# Patient Record
Sex: MALE | Race: WHITE | NOT HISPANIC OR LATINO | Employment: FULL TIME | URBAN - METROPOLITAN AREA
[De-identification: names, ages, dates, MRNs, and addresses within clinical notes are randomized per-mention and may not be internally consistent; named-entity substitution may affect disease eponyms.]

---

## 2018-09-07 ENCOUNTER — APPOINTMENT (OUTPATIENT)
Dept: URGENT CARE | Facility: CLINIC | Age: 48
End: 2018-09-07
Payer: OTHER MISCELLANEOUS

## 2018-09-07 ENCOUNTER — APPOINTMENT (OUTPATIENT)
Dept: RADIOLOGY | Facility: CLINIC | Age: 48
End: 2018-09-07
Payer: OTHER MISCELLANEOUS

## 2018-09-07 DIAGNOSIS — M79.672 LEFT FOOT PAIN: Primary | ICD-10-CM

## 2018-09-07 DIAGNOSIS — M79.672 LEFT FOOT PAIN: ICD-10-CM

## 2018-09-07 PROCEDURE — G0382 LEV 3 HOSP TYPE B ED VISIT: HCPCS

## 2018-09-07 PROCEDURE — 73630 X-RAY EXAM OF FOOT: CPT

## 2018-09-07 PROCEDURE — 99283 EMERGENCY DEPT VISIT LOW MDM: CPT

## 2018-09-12 ENCOUNTER — APPOINTMENT (OUTPATIENT)
Dept: URGENT CARE | Facility: CLINIC | Age: 48
End: 2018-09-12
Payer: OTHER MISCELLANEOUS

## 2018-09-12 PROCEDURE — 99213 OFFICE O/P EST LOW 20 MIN: CPT

## 2024-01-17 NOTE — TELEPHONE ENCOUNTER
Caller: Patient    Doctor: Jack    Reason for call: Patient's physician Dr. Amari Colin will be sending over records to you to review for this patient. He would like him to come to you for a second opinion. Patient had surgery he stated on his shoulder RTC Repair about 8 months ago with someone in [facility] and is having problems.    This is a Work Comp case. His  or physician will be sending records, please keep an eye out, once reviewed please have office staff call patient to schedule.    I advised patient I am unable to schedule until records are reviewed as per protocol for second opinions.       Call back#: [phone]

## 2024-01-18 NOTE — TELEPHONE ENCOUNTER
No records scanned in as of today.  Please keep eye out and set aside. Please give to Jack once we receive to make sure we have everything he needs to provide an opinion

## 2024-01-24 NOTE — TELEPHONE ENCOUNTER
Caller: Patient    Doctor: Jack    Reason for call: Patient called to verify Dr. Santiago's fax number and stated he will [facility] to have medical records faxed over to be reviewed for 2nd opinion of rotator cuff sx from May 2023.    Please advise patient once records are reviewed.    Call back#: [phone]

## 2024-02-15 ENCOUNTER — ANESTHESIA (OUTPATIENT)
Dept: ANESTHESIOLOGY | Facility: HOSPITAL | Age: 54
End: 2024-02-15

## 2024-02-15 ENCOUNTER — ANESTHESIA EVENT (OUTPATIENT)
Dept: ANESTHESIOLOGY | Facility: HOSPITAL | Age: 54
End: 2024-02-15

## 2024-02-15 VITALS
DIASTOLIC BLOOD PRESSURE: 95 MMHG | WEIGHT: 206 LBS | BODY MASS INDEX: 28.84 KG/M2 | SYSTOLIC BLOOD PRESSURE: 147 MMHG | HEART RATE: 103 BPM | HEIGHT: 71 IN

## 2024-02-15 DIAGNOSIS — M75.101 TEAR OF RIGHT SUPRASPINATUS TENDON: Primary | ICD-10-CM

## 2024-02-15 PROCEDURE — 99204 OFFICE O/P NEW MOD 45 MIN: CPT | Performed by: ORTHOPAEDIC SURGERY

## 2024-02-15 RX ORDER — CHLORHEXIDINE GLUCONATE ORAL RINSE 1.2 MG/ML
15 SOLUTION DENTAL ONCE
OUTPATIENT
Start: 2024-02-15 | End: 2024-02-15

## 2024-02-15 NOTE — PATIENT INSTRUCTIONS
You are being scheduled for a shoulder arthroscopy to treat your symptoms.  Below are some instructions and information on what to expect before and after your surgery.        Pre-Surgical Preparation for Arthroscopic Shoulder Surgery:     You will be contacted the evening prior to your surgery to confirm the scheduled time of the procedure and when to arrive at the hospital.   Do not eat or drink anything after midnight the night before your surgery.  Since you are having out-patient surgery, make sure that you have someone who can drive you home later in the day.  Also, prepare that person for a long day, as the process of safely preparing for and recovering from the procedure is more time consuming than the actual procedure!      As you will be in a sling after surgery, please wear or bring a loose fitting button-down shirt so that you can easily place this over the sling when you leave the surgical suite.  This avoids having to place the operative arm in a sleeve.  Most patients find that this is the easiest outfit to wear for the first week or so after surgery so you may want to plan accordingly.    Most patients find that lying down in bed after shoulder surgery accentuates their discomfort.  This is likely related to the effect of gravity on the swelling in the shoulder.  As a result, most patients sleep better in a recliner or in bed with pillows propped up behind their back for the first few days or weeks after surgery.  It is a good idea to plan for this ahead of time so there will be less hassle getting things set up the night after surgery.       What to Expect After Arthroscopic Shoulder Surgery:    It is normal to have swelling and discomfort in the shoulder for several days or a week after surgery.  It is also normal to have a small amount of drainage from the surgical wounds (especially the first few days after surgery), as we put fluid into the shoulder to visualize the structures during surgery.   "It is NOT normal to have foul smelling, purulent drainage and if this is noted, please contact the office immediately or proceed to the emergency room for evaluation as this may indicate an infection.     Applying ice bags to the shoulder may help with pain that is not controlled by the regional block.  Ice should be applied 20-30 minutes at a time, every hour or two.  Make sure to put a thin towel or T-shirt next to your skin to avoid direct contact of the ice with the skin. Icing is most helpful in the first 48 hours, although many people find that continuing past this time frame lessens their postoperative pain.  Please note that your post-operative dressing is not conductive to ice, so if you need to, it is okay to remove that dressing even the night after surgery and place band-aids over the wounds in order for the ice to take effect.     Pain Control    Most patients will receive a nerve block, the local anesthetic may keep your whole arm numb for up to 4 days.  You will be given a prescription for narcotic pain medication when you are discharged from the hospital.  With the newer nerve block that is being utilized, patients are rarely requiring the use of this narcotic pain medication.  If you find you do not tolerate that type of pain medicine well, call our office and we will try another one.     In addition to the narcotic pain medication, it is safe to use an anti-inflammatory (unless the patient has a medical condition that would not allow safe use of this mediation).  This includes the Advil, Motrin, Ibuprofen and Alleve category of medications.  Simply follow the over the counter dosing on the package and use as indicated as another adjunct.  Importantly since these medications are all very similar, use only one of them.  Tylenol is a separate medication that can be utilized as well and can be taken at the same time as the other medication or given in a \"staggered\" manner.  Just make sure that you " follow the dosing on the over the counter bottle instructions.  Also make sure that the pain medication prescribed by Dr Santiago's team does not contain acetaminophen (this is found in Percocet and Vicodin).   Typically we do not prescribe those types of pain medications but if for some reason that has been prescribed DO NOT add more Tylenol (acetaminophen) as you could end up taking too much of that medication.    As mentioned above, most patients find that lying down accentuates their discomfort. You might sleep better in a recliner, or propped up in bed.     Dr. Santiago encourages patients to safely ambulate around the house as much as possible in the first few days after the procedure as this can help with blood circulation in the legs. While the incidence of blood clots is very rare following shoulder surgery, early ambulation is a great way to help decrease the already low rate.    24 hours after the surgery you may remove the bandage and cover incisions with Band-Aids if needed. At that time you may shower, the wounds will have a surgical glue that will protect them from shower water but do not submerge your incisions directly (bathing or swimming) until at least 2 weeks post-operatively.  It is safe to let the arm hang at the side and take a shower and put on a shirt without the sling on.  Just make sure that you do not use the operative are to reach out and grab anything as that may damage the repair.  When you are done showering and getting dressed please return the operative arm to the sling.    Unless noted otherwise in your discharge paperwork, Dr. Santiago uses absorbable sutures so they do not need to be removed.    Dr. Santiago’s physician assistant (PA) will see you in the office a few days after the procedure to review the intra-operative findings and to initiate physical therapy if appropriate.  A post-operative appointment should have been scheduled for you already, but if for some reason this did  not happen, please call the office to make one.     Physical therapy is important after nearly all shoulder surgeries and a detailed rehabilitation plan based on the specific intra-operative findings and procedures will be provided to your therapist at the first post-operative office visit.  Most patients have post-operative therapy appointments scheduled pre-operatively, but if you do not, that will be handled at the first post-operative office visit.  Unless expressly directed otherwise it is safe to remove the sling even the first day after the surgery and let the arm hang by the side.  This allows patients to shower and even put a shirt on (bad arm in the sleeve first).  It is also safe to flex and extend their wrist, hand and fingers as much as possible when the block wears off.  These simple motions can serve to pump fluid out of the forearm and decrease swelling in the arm.

## 2024-02-15 NOTE — LETTER
February 15, 2024     Amari Colin MD  [address]  [address]  [facility] [address]    Patient: Amari Jenkins   YOB: 1970   Date of Visit: 2/15/2024       Dear Dr. Colin:    Thank you for referring Amari Jenkins to me for evaluation. Below are my notes for this consultation.    If you have questions, please do not hesitate to call me. I look forward to following your patient along with you.         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  Clinically the long head biceps tendon rupture is not significant and I do not recommend further treatment for that although will result in a cosmetic deformity which the patient is willing to accept.  The recurrent supraspinatus tear is a different matter and I do believe in my opinion for the best chance of the patient to return to his preinjury level of function he should consider revision rotator cuff repair.  We did discuss the complexities of revision repair and the decreased success that procedure has compared to primary repair but given the patient's young age, his high activity level, and his desire to return to his preinjury level of function he does wish to undergo revision repair.  He does understand that if revision repair is not able to improve his function and symptoms then further more aggressive treatment in the form of arthroplasty may have to be considered, I am hopeful to avoid this in a young active individual hence my opinion to attempt a revision repair.    A thorough discussion was performed with the patient regarding the risks and  benefit of operative and nonoperative treatment of their rotator cuff tear.  Risks discussed include but were not limited to infection, neurovascular injury, recurrent tear, nonhealing of the repair, need for further surgery, need for biceps tenodesis or tenotomy, stiffness, need for prolonged rehabilitation, as well as the risk of anesthesia.  After this discussion all questions were answered and informed consent was obtained in the office for arthroscopic revision rotator cuff repair of the right shoulder.  The patient will be scheduled for this procedure accordingly.     Discussed smoking cessation as it can have a direct impact on healing.  The patient has committed to doing what ever he can to decrease the amount of smoking he currently does to help him achieve success with revision repair.     The patient is being cared for by Dr. Colin of physical medicine and rehabilitation and as such his restrictions are being managed by him and we will continue to allow him to manage those restrictions for his return to work.  I will provide guidance postoperatively as to what the shoulder can except as far as use following revision rotator cuff repair.      Subjective:   Patient ID: Amari Jenkins is a 54 y.o. male      HPI  The patient presents with a chief complaint of right shoulder pain.   The pain began 3/14/23 and is associated with an acute injury.  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 He was seen by Dr. rBennen Casas who ordered a MRI.  Patient had a right shoulder arthroscopic rotator cuff repair, subacromial decompression and partial acromioplasty, performed on 5/4/2023 by Dr. Brennen Casas at [facility].  He reports minimal improvement following surgery.  He reports persistent pain and weakness.    The patient describes the pain as aching, dull, and sharp in intensity,  intermittent in timing, and localizes the pain to the  right subacromial  "joint.  The pain is worse with movement, overhead work, overuse, and raising arm over head and relieved by rest.  Patient reports numbness and tingling in the thumb, index and long fingers.  The pain is not associated with constitutional symptoms. The patient is awoken at night by the pain.        The following portions of the patient's history were reviewed and updated as appropriate: allergies, current medications, past family history, past medical history, past social history, past surgical history and problem list.        Objective:  /95 (BP Location: Right arm, Patient Position: Sitting, Cuff Size: Large)   Pulse 103   Ht 5' 11\" (1.803 m)   Wt 93.4 kg (206 lb)   BMI 28.73 kg/m²       Right Shoulder Exam     Tenderness   The patient is experiencing no tenderness.    Range of Motion   External rotation:  70   Forward flexion:  170   Internal rotation 0 degrees:  Mid thoracic     Muscle Strength   Abduction: 4/5   External rotation: 4/5     Tests   Terry test: positive  Impingement: positive    Other   Erythema: absent  Sensation: normal  Pulse: present    Comments:    Positive pop-eye deformity             Physical Exam  Vitals and nursing note reviewed.   Constitutional:       Appearance: He is well-developed.   HENT:      Head: Normocephalic and atraumatic.   Eyes:      Pupils: Pupils are equal, round, and reactive to light.   Cardiovascular:      Rate and Rhythm: Normal rate and regular rhythm.      Pulses: Normal pulses.      Heart sounds: Normal heart sounds.   Pulmonary:      Effort: Pulmonary effort is normal. No respiratory distress.      Breath sounds: Normal breath sounds.   Abdominal:      General: Abdomen is flat. There is no distension.      Palpations: Abdomen is soft.   Musculoskeletal:      Cervical back: Normal range of motion and neck supple.   Skin:     General: Skin is warm and dry.   Neurological:      Mental Status: He is alert and oriented to person, place, and time. "   Psychiatric:         Mood and Affect: Mood normal.         Behavior: Behavior normal.       External Records Reviewed: Operative reports, office notes from Dr. Casas and office notes from Dr. Colin.     I have personally reviewed pertinent films in PACS and my interpretation is as follows.  MRI arthrogram right shoulder demonstrates post surgical changes with appropriate position of anchors, recurrent partial thickness supraspinatus tear possibly extending to the anterior infraspinatus.  The midportion of the supraspinatus does appear to have a full-thickness recurrent tear through which contrast is leaking    Preoperative MRI of the right shoulder from April 3, 2023 was also reviewed to compare to the postoperative image.  The preoperative MRI demonstrates a full-thickness supraspinatus tendon tear with retraction to the humeral head and no significant muscle atrophy    Scribe Attestation      I,:  Alyce Garg am acting as a scribe while in the presence of the attending physician.:       I,:  Navjot Santiago MD personally performed the services described in this documentation    as scribed in my presence.:

## 2024-02-15 NOTE — PROGRESS NOTES
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  Clinically the long head biceps tendon rupture is not significant and I do not recommend further treatment for that although will result in a cosmetic deformity which the patient is willing to accept.  The recurrent supraspinatus tear is a different matter and I do believe in my opinion for the best chance of the patient to return to his preinjury level of function he should consider revision rotator cuff repair.  We did discuss the complexities of revision repair and the decreased success that procedure has compared to primary repair but given the patient's young age, his high activity level, and his desire to return to his preinjury level of function he does wish to undergo revision repair.  He does understand that if revision repair is not able to improve his function and symptoms then further more aggressive treatment in the form of arthroplasty may have to be considered, I am hopeful to avoid this in a young active individual hence my opinion to attempt a revision repair.    A thorough discussion was performed with the patient regarding the risks and benefit of operative and nonoperative treatment of their rotator cuff tear.  Risks discussed include but were not limited to infection, neurovascular injury, recurrent tear, nonhealing of the repair, need for further surgery, need for biceps tenodesis or tenotomy, stiffness, need for prolonged rehabilitation, as well as the risk of anesthesia.  After this discussion all questions were answered and informed consent was obtained in the office for arthroscopic revision rotator cuff repair of the right shoulder.  The patient will be scheduled for this procedure  accordingly.     Discussed smoking cessation as it can have a direct impact on healing.  The patient has committed to doing what ever he can to decrease the amount of smoking he currently does to help him achieve success with revision repair.     The patient is being cared for by Dr. Colin of physical medicine and rehabilitation and as such his restrictions are being managed by him and we will continue to allow him to manage those restrictions for his return to work.  I will provide guidance postoperatively as to what the shoulder can except as far as use following revision rotator cuff repair.      Subjective:   Patient ID: Amari Jenkins is a 54 y.o. male      HPI  The patient presents with a chief complaint of right shoulder pain.   The pain began 3/14/23 and is associated with an acute injury.  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 He was seen by Dr. Brennen Casas who ordered a MRI.  Patient had a right shoulder arthroscopic rotator cuff repair, subacromial decompression and partial acromioplasty, performed on 5/4/2023 by Dr. Brennen Casas at [facility].  He reports minimal improvement following surgery.  He reports persistent pain and weakness.    The patient describes the pain as aching, dull, and sharp in intensity,  intermittent in timing, and localizes the pain to the  right subacromial joint.  The pain is worse with movement, overhead work, overuse, and raising arm over head and relieved by rest.  Patient reports numbness and tingling in the thumb, index and long fingers.  The pain is not associated with constitutional symptoms. The patient is awoken at night by the pain.        The following portions of the patient's history were reviewed and updated as appropriate: allergies, current medications, past family history, past medical history, past social history, past surgical history and problem list.        Objective:  /95 (BP  "Location: Right arm, Patient Position: Sitting, Cuff Size: Large)   Pulse 103   Ht 5' 11\" (1.803 m)   Wt 93.4 kg (206 lb)   BMI 28.73 kg/m²       Right Shoulder Exam     Tenderness   The patient is experiencing no tenderness.    Range of Motion   External rotation:  70   Forward flexion:  170   Internal rotation 0 degrees:  Mid thoracic     Muscle Strength   Abduction: 4/5   External rotation: 4/5     Tests   Terry test: positive  Impingement: positive    Other   Erythema: absent  Sensation: normal  Pulse: present    Comments:    Positive pop-eye deformity             Physical Exam  Vitals and nursing note reviewed.   Constitutional:       Appearance: He is well-developed.   HENT:      Head: Normocephalic and atraumatic.   Eyes:      Pupils: Pupils are equal, round, and reactive to light.   Cardiovascular:      Rate and Rhythm: Normal rate and regular rhythm.      Pulses: Normal pulses.      Heart sounds: Normal heart sounds.   Pulmonary:      Effort: Pulmonary effort is normal. No respiratory distress.      Breath sounds: Normal breath sounds.   Abdominal:      General: Abdomen is flat. There is no distension.      Palpations: Abdomen is soft.   Musculoskeletal:      Cervical back: Normal range of motion and neck supple.   Skin:     General: Skin is warm and dry.   Neurological:      Mental Status: He is alert and oriented to person, place, and time.   Psychiatric:         Mood and Affect: Mood normal.         Behavior: Behavior normal.       External Records Reviewed: Operative reports, office notes from Dr. Casas and office notes from Dr. Colin.     I have personally reviewed pertinent films in PACS and my interpretation is as follows.  MRI arthrogram right shoulder demonstrates post surgical changes with appropriate position of anchors, recurrent partial thickness supraspinatus tear possibly extending to the anterior infraspinatus.  The midportion of the supraspinatus does appear to have a " full-thickness recurrent tear through which contrast is leaking    Preoperative MRI of the right shoulder from April 3, 2023 was also reviewed to compare to the postoperative image.  The preoperative MRI demonstrates a full-thickness supraspinatus tendon tear with retraction to the humeral head and no significant muscle atrophy    Scribe Attestation      I,:  Alyce Garg am acting as a scribe while in the presence of the attending physician.:       I,:  Navjot Santiago MD personally performed the services described in this documentation    as scribed in my presence.:

## 2024-02-22 ENCOUNTER — PATIENT OUTREACH (OUTPATIENT)
Dept: OTHER | Facility: CLINIC | Age: 54
End: 2024-02-22

## 2024-02-22 ENCOUNTER — TELEPHONE (OUTPATIENT)
Age: 54
End: 2024-02-22

## 2024-02-22 ENCOUNTER — TELEPHONE (OUTPATIENT)
Dept: OBGYN CLINIC | Facility: OTHER | Age: 54
End: 2024-02-22

## 2024-02-22 NOTE — TELEPHONE ENCOUNTER
I called patient yesterday and today asking for an update for w/c, he states information is in chart but the number listed in his claim in not a valid number. The PEC team tried to call the phone number and the calls will not go through. I told him because he does not have surgery and we cannot get a hold of an  they may eventually have to be self pay and I could provide him to price checkers number. Patient state he will call his previous doctor to get information as he does not have anything. He did say he has a  as well but did not provide further information about w/c. Patient will call myself or the office with further information.    REVIEW OF SYSTEMS:    GENERAL:  Patient denies fever, chills, tiredness, malaise.  EYES:  Patient denies blurred vision, double vision, pain, burning and itching.   NEUROLOGIC:  Patient denies tremors, dizzy spells, numbness, tingling, vision change, loss of balance.  ENDOCRINE:  Patient denies excessive thirst, heat intolerance, lymphnode enlargement.  GASTROINTESTINAL:  Patient denies abdominal pain, nausea/vomiting, indigestion/heartburn, diarrhea, constipation.  CARDIOVASCUALR:  Patient denies chest pain, varicose veins, high blood pressure.  SKIN:  Patient denies skin rashes, boils, persistent itching, acne.  MUSCULOSKELETAL:  Patient denies joint pain, neck pain, back pain, leg swelling.  ENT/MOUTH:  Patient denies ear infections, sore throats, sinus problems, hearing loss.  RESPIRATORY:  Patient denies wheezing, frequent cough, but complains of shortness of breath.   :  Patient denies urine retention, painful urination, urinary frequency, blood in urine, nocturia.  HEME/LYMPHATICE:  Patient denies swollen glands, blood clotting problems.   PSYCHOLOGICAL:  Patient denies anxiety, depression.

## 2024-02-22 NOTE — TELEPHONE ENCOUNTER
Caller: Patient    Doctor: Jack    Reason for call: Patient calling because he noticed on his MyChart that the Shoulder Arthrogram in imaging from 12/29/23 is labeled left shoulder when it should be right.    Patient would like to let the office know and see if this could be rectified, as he has concerns about Workers Comp.    Call back#: [phone]

## 2024-02-27 ENCOUNTER — TELEPHONE (OUTPATIENT)
Age: 54
End: 2024-02-27

## 2024-02-27 NOTE — TELEPHONE ENCOUNTER
Spoke with patient to let him know we do not reach out to , unfortunately the number for the  Steff is the number the [employer] has been calling with no response. He says not to cancel his surgery but I did mention that the auth team said if they do not hear back by tomorrow we will have to reschedule his surgery or carine it as self pay and send case to price checkers. He is aware and understood.

## 2024-02-27 NOTE — TELEPHONE ENCOUNTER
Caller: Domenica    Doctor: Jack    Reason for call: Please call  Insurance they need to speak to someone regarding the SX for this Friday.  Works comp # Steff Singh [id number]  Also call law office  Call back#: [phone]

## 2024-02-29 ENCOUNTER — TELEPHONE (OUTPATIENT)
Dept: OBGYN CLINIC | Facility: OTHER | Age: 54
End: 2024-02-29

## 2024-03-21 ENCOUNTER — TELEPHONE (OUTPATIENT)
Age: 54
End: 2024-03-21

## 2024-03-21 NOTE — TELEPHONE ENCOUNTER
Caller: W/C    Doctor: Jack    Reason for call: Asked if patient has seen jack since 2/15/24, information was given

## 2024-08-12 ENCOUNTER — PREP FOR PROCEDURE (OUTPATIENT)
Dept: OBGYN CLINIC | Facility: OTHER | Age: 54
End: 2024-08-12

## 2024-08-12 DIAGNOSIS — M75.101 TEAR OF RIGHT SUPRASPINATUS TENDON: Primary | ICD-10-CM

## 2024-08-12 RX ORDER — SODIUM CHLORIDE, SODIUM LACTATE, POTASSIUM CHLORIDE, CALCIUM CHLORIDE 600; 310; 30; 20 MG/100ML; MG/100ML; MG/100ML; MG/100ML
125 INJECTION, SOLUTION INTRAVENOUS CONTINUOUS
OUTPATIENT
Start: 2024-08-12

## 2024-08-14 ENCOUNTER — ANESTHESIA EVENT (OUTPATIENT)
Dept: PERIOP | Facility: AMBULARY SURGERY CENTER | Age: 54
End: 2024-08-14
Payer: OTHER MISCELLANEOUS

## 2024-08-19 ENCOUNTER — TELEPHONE (OUTPATIENT)
Dept: OBGYN CLINIC | Facility: OTHER | Age: 54
End: 2024-08-19

## 2024-08-19 NOTE — TELEPHONE ENCOUNTER
Caller: Patient    Doctor: Jack    Reason for call: Patient is returning a call from the office. Stated the surgery coordinator Aracelis should have the number. I mentioned to the patient that Aracelis is the employee that left him a message. Amari stated he will have to contact his  office for the workers comp number because he does not have it.     Call back#: [phone]

## 2024-08-19 NOTE — TELEPHONE ENCOUNTER
Spoke with patient and he provided a new number for his  [phone] Samuel Kendrick. I told him I will pass this information to the [employer] team

## 2024-08-23 NOTE — PRE-PROCEDURE INSTRUCTIONS
Pre-Surgery Instructions:   Medication Instructions    Acetaminophen 500 MG Take day of surgery.    Medication instructions for day surgery reviewed. Please use only a sip of water to take your instructed medications. Avoid all over the counter vitamins, supplements and NSAIDS for one week prior to surgery per anesthesia guidelines. Tylenol is ok to take as needed.     You will receive a call one business day prior to surgery with an arrival time and hospital directions. If your surgery is scheduled on a Monday, the hospital will be calling you on the Friday prior to your surgery. If you have not heard from anyone by 8pm, please call the hospital supervisor through the hospital  at [phone]. ([facility] [phone] or [place] [phone]).    Do not eat or drink anything after midnight the night before your surgery, including candy, mints, lifesavers, or chewing gum. Do not drink alcohol 24hrs before your surgery. Try not to smoke at least 24hrs before your surgery.       Follow the pre surgery showering instructions as listed in the “My Surgical Experience Booklet” or otherwise provided by your surgeon's office. Do not use a blade to shave the surgical area 1 week before surgery. It is okay to use a clean electric clippers up to 24 hours before surgery. Do not apply any lotions, creams, including makeup, cologne, deodorant, or perfumes after showering on the day of your surgery. Do not use dry shampoo, hair spray, hair gel, or any type of hair products.     No contact lenses, eye make-up, or artificial eyelashes. Remove nail polish, including gel polish, and any artificial, gel, or acrylic nails if possible. Remove all jewelry including rings and body piercing jewelry.     Wear causal clothing that is easy to take on and off. Consider your type of surgery.    Keep any valuables, jewelry, piercings at home. Please bring any specially ordered equipment (sling, braces) if indicated.    Arrange for a  responsible person to drive you to and from the hospital on the day of your surgery. Please confirm the visitor policy for the day of your procedure when you receive your phone call with an arrival time.     Call the surgeon's office with any new illnesses, exposures, or additional questions prior to surgery.    Please reference your “My Surgical Experience Booklet” for additional information to prepare for your upcoming surgery.

## 2024-08-27 ENCOUNTER — TELEPHONE (OUTPATIENT)
Age: 54
End: 2024-08-27

## 2024-08-27 NOTE — TELEPHONE ENCOUNTER
Caller: [facility]     Doctor: Jack    Reason for call: she called for a new PT script and if the pt should have a pre-op PT appointment. Patient is scheduled for his post op therapy appointment    Call back#: [phone]

## 2024-08-28 ENCOUNTER — ANESTHESIA (OUTPATIENT)
Dept: PERIOP | Facility: AMBULARY SURGERY CENTER | Age: 54
End: 2024-08-28
Payer: OTHER MISCELLANEOUS

## 2024-08-28 DIAGNOSIS — M75.101 TEAR OF RIGHT SUPRASPINATUS TENDON: Primary | ICD-10-CM

## 2024-08-28 NOTE — TELEPHONE ENCOUNTER
Order in computer.    Pre-op appointment not needed.  [facility]

## 2024-08-28 NOTE — TELEPHONE ENCOUNTER
Called and spoke to Asha  Message related  Pre-op Pt appt not needed    Will fax PT order   Fax# [phone]

## 2024-08-28 NOTE — TELEPHONE ENCOUNTER
Faxed PT order along with   RCR Rehab Protocol and surgery date  Confirmation that fax went through was received

## 2024-09-11 ENCOUNTER — HOSPITAL ENCOUNTER (OUTPATIENT)
Facility: AMBULARY SURGERY CENTER | Age: 54
Setting detail: OUTPATIENT SURGERY
Discharge: HOME | End: 2024-09-11
Attending: ORTHOPAEDIC SURGERY | Admitting: ORTHOPAEDIC SURGERY
Payer: OTHER MISCELLANEOUS

## 2024-09-11 VITALS
RESPIRATION RATE: 18 BRPM | OXYGEN SATURATION: 94 % | HEIGHT: 70 IN | BODY MASS INDEX: 28.2 KG/M2 | WEIGHT: 197 LBS | HEART RATE: 82 BPM | SYSTOLIC BLOOD PRESSURE: 121 MMHG | TEMPERATURE: 98 F | DIASTOLIC BLOOD PRESSURE: 79 MMHG

## 2024-09-11 DIAGNOSIS — M75.101 TEAR OF RIGHT SUPRASPINATUS TENDON: Primary | ICD-10-CM

## 2024-09-11 PROCEDURE — 29827 SHO ARTHRS SRG RT8TR CUF RPR: CPT | Performed by: ORTHOPAEDIC SURGERY

## 2024-09-11 PROCEDURE — C1713 ANCHOR/SCREW BN/BN,TIS/BN: HCPCS | Performed by: ORTHOPAEDIC SURGERY

## 2024-09-11 PROCEDURE — NC001 PR NO CHARGE: Performed by: ORTHOPAEDIC SURGERY

## 2024-09-11 PROCEDURE — C9290 INJ, BUPIVACAINE LIPOSOME: HCPCS | Performed by: ANESTHESIOLOGY

## 2024-09-11 PROCEDURE — 29827 SHO ARTHRS SRG RT8TR CUF RPR: CPT | Performed by: PHYSICIAN ASSISTANT

## 2024-09-11 DEVICE — BIO-COMP SWVLK C, CLD 4.75X19.1MM
Type: IMPLANTABLE DEVICE | Site: SHOULDER | Status: FUNCTIONAL
Brand: ARTHREX®

## 2024-09-11 DEVICE — SP FIBERTAK RC, DBLOAD TAPE BL/W, BLK/W
Type: IMPLANTABLE DEVICE | Site: SHOULDER | Status: FUNCTIONAL
Brand: ARTHREX®

## 2024-09-11 RX ORDER — DIPHENHYDRAMINE HYDROCHLORIDE 50 MG/ML
12.5 INJECTION INTRAMUSCULAR; INTRAVENOUS ONCE AS NEEDED
Status: DISCONTINUED | OUTPATIENT
Start: 2024-09-11 | End: 2024-09-11 | Stop reason: HOSPADM

## 2024-09-11 RX ORDER — OXYCODONE HYDROCHLORIDE 5 MG/1
5 TABLET ORAL EVERY 4 HOURS PRN
Status: DISCONTINUED | OUTPATIENT
Start: 2024-09-11 | End: 2024-09-11 | Stop reason: HOSPADM

## 2024-09-11 RX ORDER — ONDANSETRON 2 MG/ML
INJECTION INTRAMUSCULAR; INTRAVENOUS AS NEEDED
Status: DISCONTINUED | OUTPATIENT
Start: 2024-09-11 | End: 2024-09-11

## 2024-09-11 RX ORDER — SODIUM CHLORIDE, SODIUM LACTATE, POTASSIUM CHLORIDE, CALCIUM CHLORIDE 600; 310; 30; 20 MG/100ML; MG/100ML; MG/100ML; MG/100ML
125 INJECTION, SOLUTION INTRAVENOUS CONTINUOUS
Status: DISCONTINUED | OUTPATIENT
Start: 2024-09-11 | End: 2024-09-11 | Stop reason: HOSPADM

## 2024-09-11 RX ORDER — PROPOFOL 10 MG/ML
INJECTION, EMULSION INTRAVENOUS AS NEEDED
Status: DISCONTINUED | OUTPATIENT
Start: 2024-09-11 | End: 2024-09-11

## 2024-09-11 RX ORDER — ONDANSETRON 2 MG/ML
4 INJECTION INTRAMUSCULAR; INTRAVENOUS EVERY 6 HOURS PRN
Status: CANCELLED | OUTPATIENT
Start: 2024-09-11

## 2024-09-11 RX ORDER — ONDANSETRON 2 MG/ML
4 INJECTION INTRAMUSCULAR; INTRAVENOUS ONCE AS NEEDED
Status: DISCONTINUED | OUTPATIENT
Start: 2024-09-11 | End: 2024-09-11 | Stop reason: HOSPADM

## 2024-09-11 RX ORDER — ACETAMINOPHEN 325 MG/1
650 TABLET ORAL EVERY 6 HOURS PRN
Status: CANCELLED | OUTPATIENT
Start: 2024-09-11

## 2024-09-11 RX ORDER — CEFAZOLIN SODIUM 2 G/50ML
2000 SOLUTION INTRAVENOUS ONCE
Status: COMPLETED | OUTPATIENT
Start: 2024-09-11 | End: 2024-09-11

## 2024-09-11 RX ORDER — MIDAZOLAM HYDROCHLORIDE 2 MG/2ML
INJECTION, SOLUTION INTRAMUSCULAR; INTRAVENOUS
Status: COMPLETED | OUTPATIENT
Start: 2024-09-11 | End: 2024-09-11

## 2024-09-11 RX ORDER — FENTANYL CITRATE/PF 50 MCG/ML
25 SYRINGE (ML) INJECTION
Status: DISCONTINUED | OUTPATIENT
Start: 2024-09-11 | End: 2024-09-11 | Stop reason: HOSPADM

## 2024-09-11 RX ORDER — FENTANYL CITRATE 50 UG/ML
INJECTION, SOLUTION INTRAMUSCULAR; INTRAVENOUS
Status: COMPLETED | OUTPATIENT
Start: 2024-09-11 | End: 2024-09-11

## 2024-09-11 RX ORDER — DEXAMETHASONE SODIUM PHOSPHATE 10 MG/ML
INJECTION, SOLUTION INTRAMUSCULAR; INTRAVENOUS AS NEEDED
Status: DISCONTINUED | OUTPATIENT
Start: 2024-09-11 | End: 2024-09-11

## 2024-09-11 RX ORDER — BUPIVACAINE HYDROCHLORIDE 5 MG/ML
INJECTION, SOLUTION EPIDURAL; INTRACAUDAL
Status: COMPLETED | OUTPATIENT
Start: 2024-09-11 | End: 2024-09-11

## 2024-09-11 RX ORDER — SODIUM CHLORIDE, SODIUM LACTATE, POTASSIUM CHLORIDE, CALCIUM CHLORIDE 600; 310; 30; 20 MG/100ML; MG/100ML; MG/100ML; MG/100ML
125 INJECTION, SOLUTION INTRAVENOUS CONTINUOUS
Status: CANCELLED | OUTPATIENT
Start: 2024-09-11

## 2024-09-11 RX ORDER — OXYCODONE HYDROCHLORIDE 5 MG/1
5 TABLET ORAL EVERY 6 HOURS PRN
Qty: 13 TABLET | Refills: 0 | Status: SHIPPED | OUTPATIENT
Start: 2024-09-11

## 2024-09-11 RX ADMIN — FENTANYL CITRATE 100 MCG: 50 INJECTION INTRAMUSCULAR; INTRAVENOUS at 11:20

## 2024-09-11 RX ADMIN — PROPOFOL 200 MG: 10 INJECTION, EMULSION INTRAVENOUS at 12:39

## 2024-09-11 RX ADMIN — BUPIVACAINE HYDROCHLORIDE 10 ML: 5 INJECTION, SOLUTION EPIDURAL; INTRACAUDAL at 11:41

## 2024-09-11 RX ADMIN — BUPIVACAINE 20 ML: 13.3 INJECTION, SUSPENSION, LIPOSOMAL INFILTRATION at 11:20

## 2024-09-11 RX ADMIN — SODIUM CHLORIDE, SODIUM LACTATE, POTASSIUM CHLORIDE, AND CALCIUM CHLORIDE: .6; .31; .03; .02 INJECTION, SOLUTION INTRAVENOUS at 12:33

## 2024-09-11 RX ADMIN — ONDANSETRON 4 MG: 2 INJECTION INTRAMUSCULAR; INTRAVENOUS at 12:50

## 2024-09-11 RX ADMIN — SODIUM CHLORIDE, SODIUM LACTATE, POTASSIUM CHLORIDE, AND CALCIUM CHLORIDE: .6; .31; .03; .02 INJECTION, SOLUTION INTRAVENOUS at 11:15

## 2024-09-11 RX ADMIN — DEXAMETHASONE SODIUM PHOSPHATE 10 MG: 10 INJECTION, SOLUTION INTRAMUSCULAR; INTRAVENOUS at 12:39

## 2024-09-11 RX ADMIN — MIDAZOLAM 2 MG: 1 INJECTION INTRAMUSCULAR; INTRAVENOUS at 11:20

## 2024-09-11 RX ADMIN — CEFAZOLIN SODIUM 2000 MG: 2 SOLUTION INTRAVENOUS at 12:45

## 2024-09-11 NOTE — OP NOTE
OPERATIVE REPORT  PATIENT NAME: Amari Jenkins    :  1970  MRN: [id number]  Pt Location: [facility] OR ROOM 06    SURGERY DATE: 2024     SURGEON: Navjot Santiago MD     ASSISTANT: Belen Tatum PA-C     NOTE: Belen Tatum PA-C was present throughout the entire procedure and performed essential assistance with patient prepping, draping, positioning, suture management, wound closure, sterile dressing application and sling application, all under my direct supervision.     NOTE: No qualified resident physician was available for assistance    PREOPERATIVE DIAGNOSIS:  Right Shoulder Recurrent Supraspinatus Tear    POSTOPERATIVE DIAGNOSIS: Same    PROCEDURES: Surgical Arthroscopy Right Shoulder with Revision Rotator Cuff Repair    ANESTHESIA STAFF:  NINFA West MD      ANESTHESIA TYPE: General LMA with ultrasound guided interscalene block (Exparel). The interscalene block was provided by the anesthesia staff per my request for postoperative pain control and to decrease the use of postoperative narcotic medication for pain control.    COMPLICATIONS: None    FINDINGS: Recurrent, Delaminated Supraspinatus Tear, Mild Glenohumeral Degenerative Changes    SPECIMEN(S):  None    ESTIMATED BLOOD LOSS: Minimal    INDICATION:  Briefly, the patient is a 54 y.o.  male with right shoulder pain following a previous rotator cuff repair. MRI arthrogram scan confirmed a recurrent supraspinatus tear. The patient elected for revision arthroscopic treatment. Informed consent was obtained after a thorough discussion of the risks and benefits of the procedure, as well as alternatives to the procedure.     OPERATIVE TECHNIQUE:  On the day of surgery, I identified the patient’s right shoulder and marked it with my initials. The patient was taken to the operating room where anesthesia was induced and 2 grams of IV Cefazolin were given. The patient was examined in the supine position and was found to have full range of motion of the  right shoulder with no instability. The patient was then positioned in the semilateral [facility] chair position. All bony prominences were padded. The shoulder was prepped and draped in normal sterile fashion. After a time-out for safety, a standard posterolateral arthroscopic portal was made. Glenohumeral evaluation revealed mild/moderate degenerative changes of the humeral head and mild degenerative changes of the glenoid with no areas of full-thickness cartilage loss.  There was evidence of prior arthroscopic tenodesis with no long head biceps tendon present in the glenohumeral joint but I was able to see some sutures likely related to the tenodesis at the entrance of the bicipital groove.  The subscapularis was intact.  The previously repaired supraspinatus did have a small full-thickness recurrent tear with an undersurface delamination of approximately the middle half of the supraspinatus insertion.  The recurrent full-thickness tear was in the midportion of this articular sided delamination.  There were sutures present consistent with previous repair.  The infraspinatus was intact.  After the intra-articular evaluation was completed, the scope was then placed in the subacromial space through the same portal where a thorough bursectomy was performed. The small recurrent full thickness supraspinatus tear was opened up to allow for complete 2 lamina revision repair.  Upon opening the bursal side just anterior and just posterior to the small full-thickness tear multiple sutures were encountered and were removed, others were maintained as we did not wish to destroy the tendon itself.  After opening this up I was able to confirm grasping both the articular and bursal side lamina for repair.  The tear was found to measure 1.2 cm from anterior to posterior after preparation and was able to be easily reduced to the tuberosity.  The tuberosity was prepared in routine fashion and a double row suture bridge  configuration repair with one medial 2.6 mm double loaded Arthrex All-Suture anchor and one lateral 4.75 mm Arthrex BioComposite SwiveLock anchor using four stiches through the tendon in horizontal mattress fashion was performed achieving anatomic reduction of the rotator cuff tendon to the tuberosity.  As mentioned above care was taken to throw each pass through both the inferior articular sided lamina and the superior bursal side lamina to achieve a complete repair.  In addition the lateral row anchor had to be placed in between previously placed lateral anchors but I was able to get slightly below these and achieve excellent purchase in the lateral cortex.  The patient did have a previous well-done subacromial decompression with acromioplasty and while there was some fraying of the recurrent CA ligament this was debrided to a stable border and a revision acromioplasty was not required.   The area was then irrigated. Scope was withdrawn. Wounds were closed with 4-0 Monocryl and Histoacryl. Sterile dressings and a sling with an abduction pillow was placed. The patient was awoken without complication and returned to the recovery room in good condition. We will see the patient back in the office next week to initiate therapy following our rotator cuff repair rehabilitation protocol. At the end of procedure, the counts were correct.     PATIENT DISPOSITION:  Stable to PACU      SIGNATURE: Navjot Santiago MD  DATE: September 11, 2024  TIME: 1:34 PM

## 2024-09-11 NOTE — ANESTHESIA PROCEDURE NOTES
Peripheral Block    Patient location during procedure: holding area  Start time: 9/11/2024 11:20 AM  Reason for block: at surgeon's request and post-op pain management  Staffing  Performed by: Joe West MD  Authorized by: Joe West MD    Preanesthetic Checklist  Completed: patient identified, IV checked, site marked, risks and benefits discussed, surgical consent, monitors and equipment checked, pre-op evaluation and timeout performed  Peripheral Block  Prep: ChloraPrep  Patient monitoring: frequent blood pressure checks, continuous pulse oximetry and heart rate  Block type: Interscalene  Laterality: right  Injection technique: single-shot  Procedures: ultrasound guided, Ultrasound guidance required for the procedure to increase accuracy and safety of medication placement and decrease risk of complications.  Ultrasound permanent image saved  bupivacaine liposomal (EXPAREL) 1.3 % injection 20 mL - Perineural   20 mL - 9/11/2024 11:20:00 AM  bupivacaine (PF) (MARCAINE) 0.5 % injection 20 mL - Perineural   10 mL - 9/11/2024 11:41:00 AM  fentanyl citrate (PF) 100 MCG/2ML 50 mcg - Intravenous   100 mcg - 9/11/2024 11:20:00 AM  midazolam (VERSED) injection 0.5 mg - Intravenous   2 mg - 9/11/2024 11:20:00 AM  Needle  Needle type: Stimuplex   Needle localization: anatomical landmarks and ultrasound guidance  Assessment  Injection assessment: incremental injection, frequent aspiration, injected with ease, negative aspiration, negative for heart rate change, no paresthesia on injection, no symptoms of intraneural/intravenous injection and needle tip visualized at all times  Paresthesia pain: none  Post-procedure:  site cleaned  patient tolerated the procedure well with no immediate complications

## 2024-09-11 NOTE — ANESTHESIA POSTPROCEDURE EVALUATION
Post-Op Assessment Note    CV Status:  Stable  Pain Score: 0    Pain management: adequate       Mental Status:  Alert   Hydration Status:  Stable   PONV Controlled:  None   Airway Patency:  Patent     Post Op Vitals Reviewed: Yes    No anethesia notable event occurred.                BP   129/79   Temp   96.7   Pulse  66   Resp   14   SpO2   99

## 2024-09-11 NOTE — H&P
I identified and marked the patient in the pre-op holding area after confirming the surgical consent.  No changes to medical health since the H&P was preformed.     The patient's prescription history was queried in the [facility] database to ensure compliance with applicable state laws.    Assessment  Diagnoses and all orders for this visit:     Tear of right supraspinatus tendon        Discussion and Plan:  Unfortunately the patient has a recurrent supraspinatus tendon tear of his right shoulder well as either the sequelae of a tenotomy or a failure of a arthroscopic biceps tenodesis.  Clinically the long head biceps tendon rupture is not significant and I do not recommend further treatment for that although will result in a cosmetic deformity which the patient is willing to accept.  The recurrent supraspinatus tear is a different matter and I do believe in my opinion for the best chance of the patient to return to his preinjury level of function he should consider revision rotator cuff repair.  We did discuss the complexities of revision repair and the decreased success that procedure has compared to primary repair but given the patient's young age, his high activity level, and his desire to return to his preinjury level of function he does wish to undergo revision repair.  He does understand that if revision repair is not able to improve his function and symptoms then further more aggressive treatment in the form of arthroplasty may have to be considered, I am hopeful to avoid this in a young active individual hence my opinion to attempt a revision repair.     A thorough discussion was performed with the patient regarding the risks and benefit of operative and nonoperative treatment of their rotator cuff tear.  Risks discussed include but were not limited to infection, neurovascular injury, recurrent tear, nonhealing of the repair, need for further surgery, need for biceps tenodesis or tenotomy,  stiffness, need for prolonged rehabilitation, as well as the risk of anesthesia.  After this discussion all questions were answered and informed consent was obtained in the office for arthroscopic revision rotator cuff repair of the right shoulder.  The patient will be scheduled for this procedure accordingly.      Discussed smoking cessation as it can have a direct impact on healing.  The patient has committed to doing what ever he can to decrease the amount of smoking he currently does to help him achieve success with revision repair.     The patient is being cared for by Dr. Colin of physical medicine and rehabilitation and as such his restrictions are being managed by him and we will continue to allow him to manage those restrictions for his return to work.  I will provide guidance postoperatively as to what the shoulder can except as far as use following revision rotator cuff repair.        Subjective:   Patient ID: Amari Jenkins is a 54 y.o. male        HPI  The patient presents with a chief complaint of right shoulder pain.   The pain began 3/14/23 and is associated with an acute injury.  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 He was seen by Dr. Brennen Casas who ordered a MRI.  Patient had a right shoulder arthroscopic rotator cuff repair, subacromial decompression and partial acromioplasty, performed on 5/4/2023 by Dr. Brennen Casas at [facility].  He reports minimal improvement following surgery.  He reports persistent pain and weakness.     The patient describes the pain as aching, dull, and sharp in intensity,  intermittent in timing, and localizes the pain to the  right subacromial joint.  The pain is worse with movement, overhead work, overuse, and raising arm over head and relieved by rest.  Patient reports numbness and tingling in the thumb, index and long fingers.  The pain is not associated with constitutional symptoms. The patient  "is awoken at night by the pain.          The following portions of the patient's history were reviewed and updated as appropriate: allergies, current medications, past family history, past medical history, past social history, past surgical history and problem list.           Objective:  /95 (BP Location: Right arm, Patient Position: Sitting, Cuff Size: Large)   Pulse 103   Ht 5' 11\" (1.803 m)   Wt 93.4 kg (206 lb)   BMI 28.73 kg/m²         Right Shoulder Exam      Tenderness   The patient is experiencing no tenderness.     Range of Motion   External rotation:  70   Forward flexion:  170   Internal rotation 0 degrees:  Mid thoracic      Muscle Strength   Abduction: 4/5   External rotation: 4/5      Tests   Terry test: positive  Impingement: positive     Other   Erythema: absent  Sensation: normal  Pulse: present     Comments:    Positive pop-eye deformity                  Physical Exam  Vitals and nursing note reviewed.   Constitutional:       Appearance: He is well-developed.   HENT:      Head: Normocephalic and atraumatic.   Eyes:      Pupils: Pupils are equal, round, and reactive to light.   Cardiovascular:      Rate and Rhythm: Normal rate and regular rhythm.      Pulses: Normal pulses.      Heart sounds: Normal heart sounds.   Pulmonary:      Effort: Pulmonary effort is normal. No respiratory distress.      Breath sounds: Normal breath sounds.   Abdominal:      General: Abdomen is flat. There is no distension.      Palpations: Abdomen is soft.   Musculoskeletal:      Cervical back: Normal range of motion and neck supple.   Skin:     General: Skin is warm and dry.   Neurological:      Mental Status: He is alert and oriented to person, place, and time.   Psychiatric:         Mood and Affect: Mood normal.         Behavior: Behavior normal.         External Records Reviewed: Operative reports, office notes from Dr. Casas and office notes from Dr. Colin.      I have personally reviewed pertinent " films in PACS and my interpretation is as follows.  MRI arthrogram right shoulder demonstrates post surgical changes with appropriate position of anchors, recurrent partial thickness supraspinatus tear possibly extending to the anterior infraspinatus.  The midportion of the supraspinatus does appear to have a full-thickness recurrent tear through which contrast is leaking     Preoperative MRI of the right shoulder from April 3, 2023 was also reviewed to compare to the postoperative image.  The preoperative MRI demonstrates a full-thickness supraspinatus tendon tear with retraction to the humeral head and no significant muscle atrophy

## 2024-09-11 NOTE — ANESTHESIA PREPROCEDURE EVALUATION
Procedure:  SHOULDER ARTHROSCOPIC ROTATOR CUFF REPAIR-REVISION (Right: Shoulder)    Relevant Problems   ANESTHESIA (within normal limits)      CARDIO (within normal limits)      ENDO (within normal limits)      GI/HEPATIC (within normal limits)      /RENAL (within normal limits)      GYN (within normal limits)      HEMATOLOGY (within normal limits)      MUSCULOSKELETAL (within normal limits)      NEURO/PSYCH (within normal limits)      PULMONARY (within normal limits)        Physical Exam    Airway    Mallampati score: II         Dental       Cardiovascular  Cardiovascular exam normal    Pulmonary  Pulmonary exam normal     Other Findings        Anesthesia Plan  ASA Score- 1     Anesthesia Type- general with ASA Monitors.         Additional Monitors:     Airway Plan: LMA.    Comment: PNB.       Plan Factors-Exercise tolerance (METS): >4 METS.                          Induction- intravenous.    Postoperative Plan-         Informed Consent- Anesthetic plan and risks discussed with patient.  I personally reviewed this patient with the CRNA. Discussed and agreed on the Anesthesia Plan with the CRNA..

## 2024-09-16 ENCOUNTER — OFFICE VISIT (OUTPATIENT)
Dept: OBGYN CLINIC | Facility: OTHER | Age: 54
End: 2024-09-16

## 2024-09-16 VITALS
HEART RATE: 88 BPM | SYSTOLIC BLOOD PRESSURE: 133 MMHG | DIASTOLIC BLOOD PRESSURE: 84 MMHG | BODY MASS INDEX: 28.77 KG/M2 | WEIGHT: 201 LBS | HEIGHT: 70 IN

## 2024-09-16 DIAGNOSIS — Z98.890 S/P RIGHT ROTATOR CUFF REPAIR: Primary | ICD-10-CM

## 2024-09-16 PROCEDURE — 99024 POSTOP FOLLOW-UP VISIT: CPT | Performed by: PHYSICIAN ASSISTANT

## 2024-09-16 NOTE — LETTER
September 16, 2024     Renard Rogel MD  [address] [facility] [address]    Patient: Amari Jenkins   YOB: 1970   Date of Visit: 9/16/2024       Dear Dr. Rogel:    Thank you for referring Amari Jenkins to me for evaluation. Below are my notes for this consultation.    If you have questions, please do not hesitate to call me. I look forward to following your patient along with you.         Sincerely,        Belen Tatum PA-C        CC: MD Belen Lind PA-C  9/16/2024  2:43 PM  Sign when Signing Visit  Surgery: SARS w/revision RCR on 9/11/2024    S: Patient is doing well.  Denies acute post-op events.     There were no vitals taken for this visit.    O: RIGHT shoulder  Shoulder in sling  Arthroscopic portals without erythema or drainage  Mild ecchymosis around portals  Good elbow, wrist and hand range of motion  Skin - warm and dry  SI  NVI    A/P: 5 days following arthroscopic right shoulder rotator cuff repair (REVISION)  NWB RUE in sling x 6 weeks (remove Oct 23rd)  Intra-operative pictures were reviewed in detail  Formal physical therapy - following standard RCR protocol  HEP - per therapy.  For now, may start elbow/wrist/hand range of motion.  Pendulums to tolerance  Pain control - Tylenol 1000 mg every 8 hours  Ice as needed - 20 minutes on and 20 minutes off  Follow up in 8 weeks

## 2024-09-16 NOTE — PROGRESS NOTES
"Surgery: SARS w/revision RCR on 9/11/2024    S: Patient is doing well.  Denies acute post-op events. Taking Tylenol for pain.  Starts PT tomorrow    /84 (BP Location: Left arm, Patient Position: Sitting, Cuff Size: Standard)   Pulse 88   Ht 5' 10\" (1.778 m)   Wt 91.2 kg (201 lb)   BMI 28.84 kg/m²     O: RIGHT shoulder  Shoulder in sling  Arthroscopic portals without erythema or drainage  Mild ecchymosis around portals  Good elbow, wrist and hand range of motion  Skin - warm and dry  SI  NVI    A/P: 5 days following arthroscopic right shoulder rotator cuff repair (REVISION)  NWB RUE in sling x 6 weeks (remove Oct 23rd)  Intra-operative pictures were reviewed in detail  Formal physical therapy - following standard RCR protocol  HEP - per therapy.  For now, may start elbow/wrist/hand range of motion.  Pendulums to tolerance  Pain control - Tylenol 1000 mg every 8 hours  Ice as needed - 20 minutes on and 20 minutes off  Follow up in 6 weeks    Work restrictions - Per Dr Colin   "

## 2024-09-16 NOTE — LETTER
September 16, 2024     Patient: Amari Jenkins  YOB: 1970  Date of Visit: 9/16/2024      To Whom it May Concern:    Amari Jenkins is under my professional care. Amari was seen in my office on 9/16/2024. Amari is out of work at this time.  All work restrictions and future letters per Dr Colin.    If you have any questions or concerns, please don't hesitate to call.         Sincerely,          Belen Tatum PA-C        CC: No Recipients

## 2024-09-16 NOTE — PATIENT INSTRUCTIONS
Rotator Cuff Repair Rehabilitation Protocol  (adapted from Laila BALDERAS et al. “Rehabilitation of the Rotator Cuff: An Evaluation Based Approach”. JAAOS 2006; 14:599-609)  Updated 10.14  Navjot Santiago MD  [facility]  [address]  [place] [address]  [phone]  Phase 1 (Weeks 0-6)   Immediate Postoperative Period   Goals:    Diminish Pain and Inflammation  Maintain and Protect Integrity of the Repair    Gradually Increase Passive ROM (NO Active or Active Assist until Week 6)    Become Independent with Modified ADLs   Precautions:  Maintain Arm in Abduction Sling, Remove Only for Directed Exercises (may remove Abduction Pillow after Day 21 for comfort)    Keep Incisions Clean & Dry (okay to shower in 48 hours, band-aids over incisions)  No Immersion (pool) until Wounds Totally Sealed (usually not prior to day #10)  Passive Shoulder Motion ONLY, No Lifting/Holding Objects, Reaching Behind Back  Okay to Type/Write at Desktop with Arm in Sling   Day 0-6    Elbow, Wrist, Hand AROM Exercises     Start Cervical AROM and Scapula Isometrics    Cryotherapy/Ice for Pain and Inflammation    Instruct in Hygiene, Posture, and Positioning    Day 7-28    Continue Above  May Start Pendulum Exercises    May Start Supine, Pain Free, PT assisted PROM  Forward Flexion to 90°, External Rotation to 35° (Elbow at side), Internal Rotation to Body, Abduction to 60°    Can Introduce light Cardio (Walking, Stationary Bike)    Aqua Therapy may begin at week 3 (day 21) as long as no wound problems   Day 29-42    Continue Above  Progress PROM to Goal of full PROM by Week 6.  May add Gentle Mobilizations (GH and Scapulothoracic) to Regain full PROM if Needed.  May add Heat prior to PROM Exercises, Ice after Exercises  May Begin AAROM at Day 29 if ROM is Appropriate in Anticipation of AROM Starting at Week 6   Criteria to Progress to Phase 2    Reasonable Passive Forward Flexion, Abduction ,  IR/ER    Time  Phase 2 (Weeks 6-12)   Protection and Active Motion   Goals of Phase:    Allow Healing of Soft Tissues    Decrease Pain and Inflammation  Add ADLs and Regain AROM by End of Phase   Precautions:    NO STRENGTHENING until Phase 3    Repair is Most Prone to Failure during this Phase!    No Lifting Objects > 2 lbs (Coffee Cup OK), no Sudden Motions    Avoid Upper Extremity Bike and Ergometer   Day 43-56    Discontinue Sling  Initiate AROM Exercises (forward flexion, ER, IR and abduction), Rotator Cuff Isometrics    Continue Periscapular Exercises, add Stretching if PROM Lacking   No Strengthening until Week 12 (Minimum Time Needed for Cuff Healing Sufficient to withstand Strengthening)     Phase 3 (Weeks 12-16)   Early Strengthening   Goal of Phase:    Gain full AROM, Maintain PROM    Gradual return of Shoulder Strength, Power and Endurance    Gradual return to Functional Activities   Precautions:    No Lifting > 10lbs, Sudden Lifting or Pushing activities, Overhead Lifting    No Upper Extremity Bike or Ergometer   Week 12    Initiate Strengthening Program (10 lb Maximum until Phase 4)      ER/IR with Bands (Standing)      ER in Lateral Decubitius Position      Lateral Raises      Full Can in Scapular Plane      Prone Rowing, Horizontal Abduction, Extension      Elbow Flexion/Extension   Week 14-16    Initiate light Functional Activities as Permitted  Progress to Fundamental Shoulder Exercises  Phase 4 (Variable but Weeks 16-24)   Aggressive Rehab  Sport Specific or Activity Specific    Goals of Phase:    Maintain Full Pain-free AROM    Advanced Conditioning Exercises for enhanced Functional use    Continue regaining Shoulder Strength, Power and Endurance    Eventual return to full Functional Activities   Precautions:    None   Week 16    Continue ROM and stretching if appropriate    Progress Strengthening, Proprioceptive and Neuromuscular Training    Light Sports if Progressing Well (Chipping/Putting,  easy ground strokes etc.)   Week 20    Continue Strengthening and Stretching    Initiate Interval Sports Program as Appropriate    Note:   This is a general program which may be modified based on intra-operative findings, additional procedures preformed, repair stability, and patient biological factors.  If in doubt, please check with my office for individual patient specifics.  The ultimate goal of the surgery and rehabilitation is to get the rotator cuff to heal with the least residual functional deficit due to stiffness.  That being said, I would rather have a stiff shoulder with a healed rotator cuff repair, than a loose shoulder with a failed repair!

## 2024-09-17 ENCOUNTER — EVALUATION (OUTPATIENT)
Dept: PHYSICAL THERAPY | Facility: CLINIC | Age: 54
End: 2024-09-17
Payer: OTHER MISCELLANEOUS

## 2024-09-17 DIAGNOSIS — Z98.890 STATUS POST ROTATOR CUFF REPAIR: Primary | ICD-10-CM

## 2024-09-17 DIAGNOSIS — M75.101 TEAR OF RIGHT SUPRASPINATUS TENDON: ICD-10-CM

## 2024-09-17 PROCEDURE — 97110 THERAPEUTIC EXERCISES: CPT | Performed by: PHYSICAL THERAPIST

## 2024-09-17 PROCEDURE — 97161 PT EVAL LOW COMPLEX 20 MIN: CPT | Performed by: PHYSICAL THERAPIST

## 2024-09-17 NOTE — LETTER
2024    Navjot Santiago MD  [address] [address]    Patient: Amari Jenkins   YOB: 1970   Date of Visit: 2024     Encounter Diagnosis     ICD-10-CM    1. Status post rotator cuff repair  Z98.890 PT plan of care cert/re-cert      2. Tear of right supraspinatus tendon  M75.101 Ambulatory Referral to Physical Therapy     PT plan of care cert/re-cert          Dear Dr. Santiago:    Thank you for your recent referral of Amari Jenkins. Please review the attached evaluation summary from Amari's recent visit.     Please verify that you agree with the plan of care by signing the attached order.     If you have any questions or concerns, please do not hesitate to call.     I sincerely appreciate the opportunity to share in the care of one of your patients and hope to have another opportunity to work with you in the near future.       Sincerely,    Alyce Peres, PT      Referring Provider:      I certify that I have read the below Plan of Care and certify the need for these services furnished under this plan of treatment while under my care.                    Navjot Santiago MD  [address] [address]  Via In Basket          PT Evaluation     Today's date: 2024  Patient name: Amari Jenkins  : 1970  MRN: [id number]  Referring provider: Navjot Santiago*  Dx:   Encounter Diagnosis     ICD-10-CM    1. Status post rotator cuff repair  Z98.890       2. Tear of right supraspinatus tendon  M75.101 Ambulatory Referral to Physical Therapy                     Assessment  Impairments: abnormal or restricted ROM, abnormal movement, activity intolerance, impaired physical strength, lacks appropriate home exercise program, pain with function and poor posture   Symptom irritability: moderate    Assessment details: Amari Jenkins is a pleasant 54 y.o. male who presents s/p RC repair.  Physical exam is consistent with post operative  status. Primary movement impairment diagnosis of impaired ROM & strength. His impairments have lead to participation restrictions in ADLs, household chores, sleep, driving, work, and recreational activities. He would benefit from working with a skilled PT in order to increase participation in aforementioned participation restrictions.    No further referral appears necessary at this time based upon examination results.    Pt. will benefit from skilled PT services that includes manual therapy techniques to enhance tissue extensibility, neuromuscular re-education to facilitate motor control, therapeutic exercise to increase functional mobility, and modalities prn to reduce pain and inflammation.            Understanding of Dx/Px/POC: good     Prognosis: good  Prognosis details: Positive prognostic indicators include positive attitude toward recovery and absence of observed red flags.  Negative prognostic indicators include chronicity of symptoms and multiple prior failed treatments.      Goals  ST. Independent with HEP in 2 weeks  2. Pt will have verbal report of improvement in symptoms by >/=25% in 2 weeks     To be achieved by D/C   LT. Pt will improve FOTO score by >/= 5 points in 6 weeks  2. Pt will improve FOTO score to >/= 61 by visit # 24  3. Pt will be able to return to work  4. Pt will be able to perform ADLs independently   5. Pt will be able to perform household chores independently   6. Pt will be able to return to recreational activities without restriction   7. Pt will be able to drive with no difficulty       Plan  Patient would benefit from: skilled physical therapy    Planned therapy interventions: activity modification, manual therapy, motor coordination training, neuromuscular re-education, patient education, self care, therapeutic activities, therapeutic exercise, graded activity, home exercise program, graded exercise, functional ROM exercises and strengthening    Frequency: 2x  week  Duration in weeks: 16  Plan of Care beginning date: 2024  Plan of Care expiration date: 2025  Treatment plan discussed with: patient      Subjective Evaluation    History of Present Illness  Mechanism of injury: Pt under went RC repair on 24 for the second time. His original surgery was May 2023. He had no improvement from the surgery and when he was evaluated by a second opinion the tear was still present.     Since the surgery he is continuing to have pain. He notes compl    Pt is a : he would need to be able to lift 75lbs   Patient Goals  Patient goals for therapy: decreased pain and return to work  Patient goal: be able to play frisbee with his dogs, be able to ride his ATV,  be able to go camping,  Pain  At best pain ratin  At worst pain ratin    Hand dominance: right          Objective     Active Range of Motion   Cervical/Thoracic Spine       Cervical    Flexion:  WFL  Extension:  Restriction level: moderate  Left lateral flexion:  Restriction level: minimal  Right lateral flexion:  Restriction level minimal  Left rotation:  Restriction level: minimal  Right rotation:  Restriction level: minimal  Left Shoulder   Normal active range of motion    Right Wrist   Normal active range of motion    Additional Active Range of Motion Details  Did not test due to post op    Passive Range of Motion   Left Shoulder   Normal passive range of motion    Additional Passive Range of Motion Details  Did not perform as pt's protocol starts PROM on day 7 (he is post operative day 6)     Strength/Myotome Testing     Left Shoulder   Normal muscle strength    Additional Strength Details  Did not test due to post operative status              Precautions: RC repair DOS 24      Manuals             Shoulder PROM per protocol     Forward Flexion to 90°, External Rotation to 35° (Elbow at side), Internal Rotation to Body, Abduction to 60° NV (initiate on day 7)             Elbow PROM                                         Neuro Re-Ed             Scap retraction                                                                                            Ther Ex             Pt education on HEP, POC 10 min            Cervical ROM all directions  1x30            Wrist AROM all directions  1x30             Elbow AROM  NV            Pendulum  NV            Gripping  2 min                                       Ther Activity                                       Gait Training                                       Modalities                                          Phase 1 (Weeks 0-6)              Immediate Postoperative Period              Goals:                          Diminish Pain and Inflammation  Maintain and Protect Integrity of the Repair                          Gradually Increase Passive ROM (NO Active or Active Assist until Week 6)                          Become Independent with Modified ADLs              Precautions:  Maintain Arm in Abduction Sling, Remove Only for Directed Exercises (may remove Abduction Pillow after Day 21 for comfort)                          Keep Incisions Clean & Dry (okay to shower in 48 hours, band-aids over incisions)  No Immersion (pool) until Wounds Totally Sealed (usually not prior to day #10)  Passive Shoulder Motion ONLY, No Lifting/Holding Objects, Reaching Behind Back  Okay to Type/Write at Desktop with Arm in Sling              Day 0-6                          Elbow, Wrist, Hand AROM Exercises                           Start Cervical AROM and Scapula Isometrics                          Cryotherapy/Ice for Pain and Inflammation                          Instruct in Hygiene, Posture, and Positioning               Day 7-28                          Continue Above  May Start Pendulum Exercises                          May Start Supine, Pain Free, PT assisted PROM  Forward Flexion to 90°, External Rotation to 35° (Elbow at side), Internal Rotation to Body, Abduction to  60°                          Can Introduce light Cardio (Walking, Stationary Bike)                          Aqua Therapy may begin at week 3 (day 21) as long as no wound problems              Day 29-42                          Continue Above  Progress PROM to Goal of full PROM by Week 6.  May add Gentle Mobilizations (GH and Scapulothoracic) to Regain full PROM if Needed.  May add Heat prior to PROM Exercises, Ice after Exercises  May Begin AAROM at Day 29 if ROM is Appropriate in Anticipation of AROM Starting at Week 6              Criteria to Progress to Phase 2                          Reasonable Passive Forward Flexion, Abduction , IR/ER                          Time  Phase 2 (Weeks 6-12)              Protection and Active Motion              Goals of Phase:                          Allow Healing of Soft Tissues                          Decrease Pain and Inflammation  Add ADLs and Regain AROM by End of Phase              Precautions:                          NO STRENGTHENING until Phase 3                          Repair is Most Prone to Failure during this Phase!                          No Lifting Objects > 2 lbs (Coffee Cup OK), no Sudden Motions                          Avoid Upper Extremity Bike and Ergometer              Day 43-56                          Discontinue Sling  Initiate AROM Exercises (forward flexion, ER, IR and abduction), Rotator Cuff Isometrics                          Continue Periscapular Exercises, add Stretching if PROM Lacking   No Strengthening until Week 12 (Minimum Time Needed for Cuff Healing Sufficient to withstand Strengthening)                Phase 3 (Weeks 12-16)              Early Strengthening              Goal of Phase:                          Gain full AROM, Maintain PROM                          Gradual return of Shoulder Strength, Power and Endurance                          Gradual return to Functional Activities              Precautions:                          No  Lifting > 10lbs, Sudden Lifting or Pushing activities, Overhead Lifting                          No Upper Extremity Bike or Ergometer              Week 12                          Initiate Strengthening Program (10 lb Maximum until Phase 4)                            ER/IR with Bands (Standing)                            ER in Lateral Decubitius Position                            Lateral Raises                            Full Can in Scapular Plane                            Prone Rowing, Horizontal Abduction, Extension                            Elbow Flexion/Extension              Week 14-16                          Initiate light Functional Activities as Permitted  Progress to Fundamental Shoulder Exercises  Phase 4 (Variable but Weeks 16-24)              Aggressive Rehab  Sport Specific or Activity Specific               Goals of Phase:                          Maintain Full Pain-free AROM                          Advanced Conditioning Exercises for enhanced Functional use                          Continue regaining Shoulder Strength, Power and Endurance                          Eventual return to full Functional Activities              Precautions:                          None              Week 16                          Continue ROM and stretching if appropriate                          Progress Strengthening, Proprioceptive and Neuromuscular Training                          Light Sports if Progressing Well (Chipping/Putting, easy ground strokes etc.)              Week 20                          Continue Strengthening and Stretching                          Initiate Interval Sports Program as Appropriate

## 2024-09-17 NOTE — PROGRESS NOTES
PT Evaluation     Today's date: 2024  Patient name: Amari Jenkins  : 1970  MRN: [id number]  Referring provider: Navjot Santiago*  Dx:   Encounter Diagnosis     ICD-10-CM    1. Status post rotator cuff repair  Z98.890       2. Tear of right supraspinatus tendon  M75.101 Ambulatory Referral to Physical Therapy                     Assessment  Impairments: abnormal or restricted ROM, abnormal movement, activity intolerance, impaired physical strength, lacks appropriate home exercise program, pain with function and poor posture   Symptom irritability: moderate    Assessment details: Amari Jenkins is a pleasant 54 y.o. male who presents s/p RC repair.  Physical exam is consistent with post operative status. Primary movement impairment diagnosis of impaired ROM & strength. His impairments have lead to participation restrictions in ADLs, household chores, sleep, driving, work, and recreational activities. He would benefit from working with a skilled PT in order to increase participation in aforementioned participation restrictions.    No further referral appears necessary at this time based upon examination results.    Pt. will benefit from skilled PT services that includes manual therapy techniques to enhance tissue extensibility, neuromuscular re-education to facilitate motor control, therapeutic exercise to increase functional mobility, and modalities prn to reduce pain and inflammation.            Understanding of Dx/Px/POC: good     Prognosis: good  Prognosis details: Positive prognostic indicators include positive attitude toward recovery and absence of observed red flags.  Negative prognostic indicators include chronicity of symptoms and multiple prior failed treatments.      Goals  ST. Independent with HEP in 2 weeks  2. Pt will have verbal report of improvement in symptoms by >/=25% in 2 weeks     To be achieved by D/C   LT. Pt will improve FOTO score by >/= 5 points in 6 weeks  2.  Pt will improve FOTO score to >/= 61 by visit # 24  3. Pt will be able to return to work  4. Pt will be able to perform ADLs independently   5. Pt will be able to perform household chores independently   6. Pt will be able to return to recreational activities without restriction   7. Pt will be able to drive with no difficulty       Plan  Patient would benefit from: skilled physical therapy    Planned therapy interventions: activity modification, manual therapy, motor coordination training, neuromuscular re-education, patient education, self care, therapeutic activities, therapeutic exercise, graded activity, home exercise program, graded exercise, functional ROM exercises and strengthening    Frequency: 2x week  Duration in weeks: 16  Plan of Care beginning date: 2024  Plan of Care expiration date: 2025  Treatment plan discussed with: patient      Subjective Evaluation    History of Present Illness  Mechanism of injury: Pt under went RC repair on 24 for the second time. His original surgery was May 2023. He had no improvement from the surgery and when he was evaluated by a second opinion the tear was still present.     Since the surgery he is continuing to have pain. He notes compl    Pt is a : he would need to be able to lift 75lbs   Patient Goals  Patient goals for therapy: decreased pain and return to work  Patient goal: be able to play frisbee with his dogs, be able to ride his ATV,  be able to go camping,  Pain  At best pain ratin  At worst pain ratin    Hand dominance: right          Objective     Active Range of Motion   Cervical/Thoracic Spine       Cervical    Flexion:  WFL  Extension:  Restriction level: moderate  Left lateral flexion:  Restriction level: minimal  Right lateral flexion:  Restriction level minimal  Left rotation:  Restriction level: minimal  Right rotation:  Restriction level: minimal  Left Shoulder   Normal active range of motion    Right Wrist   Normal  active range of motion    Additional Active Range of Motion Details  Did not test due to post op    Passive Range of Motion   Left Shoulder   Normal passive range of motion    Additional Passive Range of Motion Details  Did not perform as pt's protocol starts PROM on day 7 (he is post operative day 6)     Strength/Myotome Testing     Left Shoulder   Normal muscle strength    Additional Strength Details  Did not test due to post operative status              Precautions: RC repair DOS 9/11/24      Manuals 9/17            Shoulder PROM per protocol     Forward Flexion to 90°, External Rotation to 35° (Elbow at side), Internal Rotation to Body, Abduction to 60° NV (initiate on day 7)             Elbow PROM                                        Neuro Re-Ed             Scap retraction                                                                                            Ther Ex             Pt education on HEP, POC 10 min            Cervical ROM all directions  1x30            Wrist AROM all directions  1x30             Elbow AROM  NV            Pendulum  NV            Gripping  2 min                                       Ther Activity                                       Gait Training                                       Modalities                                          Phase 1 (Weeks 0-6)              Immediate Postoperative Period              Goals:                          Diminish Pain and Inflammation  Maintain and Protect Integrity of the Repair                          Gradually Increase Passive ROM (NO Active or Active Assist until Week 6)                          Become Independent with Modified ADLs              Precautions:  Maintain Arm in Abduction Sling, Remove Only for Directed Exercises (may remove Abduction Pillow after Day 21 for comfort)                          Keep Incisions Clean & Dry (okay to shower in 48 hours, band-aids over incisions)  No Immersion (pool) until Wounds Totally Sealed  (usually not prior to day #10)  Passive Shoulder Motion ONLY, No Lifting/Holding Objects, Reaching Behind Back  Okay to Type/Write at Desktop with Arm in Sling              Day 0-6                          Elbow, Wrist, Hand AROM Exercises                           Start Cervical AROM and Scapula Isometrics                          Cryotherapy/Ice for Pain and Inflammation                          Instruct in Hygiene, Posture, and Positioning               Day 7-28                          Continue Above  May Start Pendulum Exercises                          May Start Supine, Pain Free, PT assisted PROM  Forward Flexion to 90°, External Rotation to 35° (Elbow at side), Internal Rotation to Body, Abduction to 60°                          Can Introduce light Cardio (Walking, Stationary Bike)                          Aqua Therapy may begin at week 3 (day 21) as long as no wound problems              Day 29-42                          Continue Above  Progress PROM to Goal of full PROM by Week 6.  May add Gentle Mobilizations (GH and Scapulothoracic) to Regain full PROM if Needed.  May add Heat prior to PROM Exercises, Ice after Exercises  May Begin AAROM at Day 29 if ROM is Appropriate in Anticipation of AROM Starting at Week 6              Criteria to Progress to Phase 2                          Reasonable Passive Forward Flexion, Abduction , IR/ER                          Time  Phase 2 (Weeks 6-12)              Protection and Active Motion              Goals of Phase:                          Allow Healing of Soft Tissues                          Decrease Pain and Inflammation  Add ADLs and Regain AROM by End of Phase              Precautions:                          NO STRENGTHENING until Phase 3                          Repair is Most Prone to Failure during this Phase!                          No Lifting Objects > 2 lbs (Coffee Cup OK), no Sudden Motions                          Avoid Upper Extremity Bike and  Ergometer              Day 43-56                          Discontinue Sling  Initiate AROM Exercises (forward flexion, ER, IR and abduction), Rotator Cuff Isometrics                          Continue Periscapular Exercises, add Stretching if PROM Lacking   No Strengthening until Week 12 (Minimum Time Needed for Cuff Healing Sufficient to withstand Strengthening)                Phase 3 (Weeks 12-16)              Early Strengthening              Goal of Phase:                          Gain full AROM, Maintain PROM                          Gradual return of Shoulder Strength, Power and Endurance                          Gradual return to Functional Activities              Precautions:                          No Lifting > 10lbs, Sudden Lifting or Pushing activities, Overhead Lifting                          No Upper Extremity Bike or Ergometer              Week 12                          Initiate Strengthening Program (10 lb Maximum until Phase 4)                            ER/IR with Bands (Standing)                            ER in Lateral Decubitius Position                            Lateral Raises                            Full Can in Scapular Plane                            Prone Rowing, Horizontal Abduction, Extension                            Elbow Flexion/Extension              Week 14-16                          Initiate light Functional Activities as Permitted  Progress to Fundamental Shoulder Exercises  Phase 4 (Variable but Weeks 16-24)              Aggressive Rehab  Sport Specific or Activity Specific               Goals of Phase:                          Maintain Full Pain-free AROM                          Advanced Conditioning Exercises for enhanced Functional use                          Continue regaining Shoulder Strength, Power and Endurance                          Eventual return to full Functional Activities              Precautions:                          None              Week 16                           Continue ROM and stretching if appropriate                          Progress Strengthening, Proprioceptive and Neuromuscular Training                          Light Sports if Progressing Well (Chipping/Putting, easy ground strokes etc.)              Week 20                          Continue Strengthening and Stretching                          Initiate Interval Sports Program as Appropriate

## 2024-09-20 ENCOUNTER — OFFICE VISIT (OUTPATIENT)
Dept: PHYSICAL THERAPY | Facility: CLINIC | Age: 54
End: 2024-09-20
Payer: OTHER MISCELLANEOUS

## 2024-09-20 DIAGNOSIS — M75.101 TEAR OF RIGHT SUPRASPINATUS TENDON: Primary | ICD-10-CM

## 2024-09-20 DIAGNOSIS — Z98.890 STATUS POST ROTATOR CUFF REPAIR: ICD-10-CM

## 2024-09-20 PROCEDURE — 97140 MANUAL THERAPY 1/> REGIONS: CPT

## 2024-09-20 PROCEDURE — 97110 THERAPEUTIC EXERCISES: CPT

## 2024-09-20 NOTE — PROGRESS NOTES
Daily Note     Today's date: 2024  Patient name: Amari Jenkins  : 1970  MRN: [id number]  Referring provider: Belen Tatum P*  Dx:   Encounter Diagnosis     ICD-10-CM    1. Tear of right supraspinatus tendon  M75.101       2. Status post rotator cuff repair  Z98.890           Start Time: 08  Stop Time: 08  Total time in clinic (min): 40 minutes    Subjective: The patient states that his right shoulder pain is about 5-6/10 this morning. He states that he has difficulty sleeping secondary to pain.       Objective: See treatment diary below      Assessment: Tolerated treatment well. Initiated PROM per protocol today with good tolerance and no increased complaints of pain. Added pendulums and elbow AROM with good tolerance and no increased complaints of pain. Patient exhibited good technique with therapeutic exercises and would benefit from continued PT      Plan: Continue per plan of care.  Progress treament per protocol.      Precautions: RC repair DOS 24      Manuals            Shoulder PROM per protocol     Forward Flexion to 90°, External Rotation to 35° (Elbow at side), Internal Rotation to Body, Abduction to 60° NV (initiate on day 7)  AW 10'           Elbow PROM                                        Neuro Re-Ed             Scap retraction   NV                                                                                         Ther Ex             Pt education on HEP, POC 10 min            Cervical ROM all directions  1x30 x30           Wrist AROM all directions  1x30  x30           Elbow AROM Flex/Ext NV X30 supine           AROM Pro/Sup  NV           Pendulum S/S, F/B NV x30           Gripping  2 min  35# x30                                     Ther Activity                                       Gait Training                                       Modalities             CP R Shld  10'

## 2024-09-24 ENCOUNTER — OFFICE VISIT (OUTPATIENT)
Dept: PHYSICAL THERAPY | Facility: CLINIC | Age: 54
End: 2024-09-24
Payer: OTHER MISCELLANEOUS

## 2024-09-24 DIAGNOSIS — M75.101 TEAR OF RIGHT SUPRASPINATUS TENDON: ICD-10-CM

## 2024-09-24 DIAGNOSIS — Z98.890 STATUS POST ROTATOR CUFF REPAIR: Primary | ICD-10-CM

## 2024-09-24 PROCEDURE — 97110 THERAPEUTIC EXERCISES: CPT

## 2024-09-24 PROCEDURE — 97140 MANUAL THERAPY 1/> REGIONS: CPT

## 2024-09-24 NOTE — PROGRESS NOTES
Daily Note     Today's date: 2024  Patient name: Amari Jenkins  : 1970  MRN: [id number]  Referring provider: Belen Tatum P*  Dx:   Encounter Diagnosis     ICD-10-CM    1. Status post rotator cuff repair  Z98.890       2. Tear of right supraspinatus tendon  M75.101           Start Time: 0850  Stop Time: 0930  Total time in clinic (min): 40 minutes    Subjective: I am still wearing the sling . I do have some pain and aching at night.      Objective: See treatment diary below      Assessment: Tolerated treatment well. Patient would benefit from continued PT   pt came into therapy today wearing his sling . He reports that he isnt having much pain . He has some trouble sleeping at night due to some mild pain and aching.  The portal sites are healing well. We continue to follow the protocol for motion. He did well today,  we adjusted his sling to help support his wrist.  We used a cold pack post for 8 min .       Plan: Continue per plan of care.      Precautions: RC repair DOS 24      DATE      FOTO        MANUALS        Shoulder PROM per protocol     Forward Flexion to 90°, External Rotation to 35° (Elbow at side), Internal Rotation to Body, Abduction to 60° NV (initiate on day 7)  AW 10' JF     Elbow PROM                         Neuro Re-Ed        Scap retraction   NV 20 x                                                     Ther Ex        Pt education on HEP, POC 10 min       Cervical ROM all directions  1x30 x30 30 x      Wrist AROM all directions  1x30  x30 30 x     Elbow AROM Flex/Ext NV X30 supine 30 x   supine     AROM Pro/Sup  NV 30 x     Pendulum S/S, F/B NV x30 30 x     Gripping  2 min  35# x30 35#   30 x                     Ther Activity                        Gait Training                        Modalities        CP R Shld  10' 10

## 2024-09-27 ENCOUNTER — OFFICE VISIT (OUTPATIENT)
Dept: PHYSICAL THERAPY | Facility: CLINIC | Age: 54
End: 2024-09-27
Payer: OTHER MISCELLANEOUS

## 2024-09-27 DIAGNOSIS — M75.101 TEAR OF RIGHT SUPRASPINATUS TENDON: Primary | ICD-10-CM

## 2024-09-27 DIAGNOSIS — Z98.890 STATUS POST ROTATOR CUFF REPAIR: ICD-10-CM

## 2024-09-27 PROCEDURE — 97110 THERAPEUTIC EXERCISES: CPT

## 2024-09-27 PROCEDURE — 97140 MANUAL THERAPY 1/> REGIONS: CPT

## 2024-09-27 NOTE — PROGRESS NOTES
"Daily Note     Today's date: 2024  Patient name: Amari Jenkins  : 1970  MRN: [id number]  Referring provider: Belen Tatum P*  Dx:   Encounter Diagnosis     ICD-10-CM    1. Tear of right supraspinatus tendon  M75.101       2. Status post rotator cuff repair  Z98.890           Start Time: 0845  Stop Time: 930  Total time in clinic (min): 45 minutes    Subjective:  My arm is feeling ok. I am wearing the sling all the time.,       Objective: See treatment diary below      Assessment: Tolerated treatment well. Patient would benefit from continued PT    pt continues to wear the sling to help support his shoulder. We continue to follow the protocol  that the Dr sent along. He is doing well in therapy with some mild pain at times.  His motion is good and is within the outlined protocol.  Pt began using 2# today for wrist flex and ext.   We increased reps for gripping to 40 x.  We ended with a cold pack for 5 min in supine to his right shoulder.       Plan: Continue per plan of care.      Precautions: RC repair DOS 24      DATE     FOTO    JF   22    MANUALS        Shoulder PROM per protocol     Forward Flexion to 90°, External Rotation to 35° (Elbow at side), Internal Rotation to Body, Abduction to 60° NV (initiate on day 7)  AW 10' JF JF    Elbow PROM                         Neuro Re-Ed        Scap retraction   NV 20 x 20 x                                                    Ther Ex        Pt education on HEP, POC 10 min       Cervical ROM all directions  1x30 x30 30 x  10 x 5 \" all    Wrist AROM all directions  1x30  x30 30 x 30 x 2#     Elbow AROM Flex/Ext NV X30 supine 30 x   supine 30 x supine    AROM Pro/Sup  NV 30 x 30 x    Pendulum S/S, F/B NV x30 30 x 30 x    Gripping  2 min  35# x30 35#   30 x 35# 40 x    Nustep no arms    NV            Ther Activity                        Gait Training                        Modalities        CP R Shld  10' 10                       "

## 2024-10-01 ENCOUNTER — OFFICE VISIT (OUTPATIENT)
Dept: PHYSICAL THERAPY | Facility: CLINIC | Age: 54
End: 2024-10-01
Payer: OTHER MISCELLANEOUS

## 2024-10-01 DIAGNOSIS — M75.101 TEAR OF RIGHT SUPRASPINATUS TENDON: ICD-10-CM

## 2024-10-01 DIAGNOSIS — Z98.890 STATUS POST ROTATOR CUFF REPAIR: Primary | ICD-10-CM

## 2024-10-01 PROCEDURE — 97110 THERAPEUTIC EXERCISES: CPT

## 2024-10-01 PROCEDURE — 97140 MANUAL THERAPY 1/> REGIONS: CPT

## 2024-10-01 NOTE — PROGRESS NOTES
"Daily Note     Today's date: 10/1/2024  Patient name: Amari Jenkins  : 1970  MRN: [id number]  Referring provider: Belen Tatum P*  Dx:   Encounter Diagnosis     ICD-10-CM    1. Status post rotator cuff repair  Z98.890       2. Tear of right supraspinatus tendon  M75.101           Start Time: 0845  Stop Time: 0930  Total time in clinic (min): 45 minutes    Subjective: My shoulder is feeling pretty good. I have some mild pain .       Objective: See treatment diary below      Assessment: Tolerated treatment well. Patient would benefit from continued PT  pt continues to wear his sling until he sees the Dr again. Pt completed his outline program today with little pain noted over his anterior right shoulder.  His motion is good and is what shoulder be as per the protocol. We did ice post for 8 min seated.  We began the Nustep today for 5 min just using his legs.       Plan: Continue per plan of care.      Precautions: RC repair DOS 24      DATE 9/17 9/20 9/24 9/27 10/1   FOTO    JF   22    MANUALS        Shoulder PROM per protocol     Forward Flexion to 90°, External Rotation to 35° (Elbow at side), Internal Rotation to Body, Abduction to 60° NV (initiate on day 7)  AW 10' JF JF JF   Elbow PROM                         Neuro Re-Ed        Scap retraction   NV 20 x 20 x 20 x                                                   Ther Ex        Pt education on HEP, POC 10 min       Cervical ROM all directions  1x30 x30 30 x  10 x 5 \" all 10 x  5\"   Wrist AROM all directions  1x30  x30 30 x 30 x 2#  30 x 2#     Elbow AROM Flex/Ext NV X30 supine 30 x   supine 30 x supine 30x supine   AROM Pro/Sup  NV 30 x 30 x 30 x   Pendulum S/S, F/B NV x30 30 x 30 x 30x   Gripping  2 min  35# x30 35#   30 x 35# 40 x 35#  40 x   Nustep no arms    NV 5 min           Ther Activity                        Gait Training                        Modalities        CP R Shld  10' 10                         "

## 2024-10-03 ENCOUNTER — OFFICE VISIT (OUTPATIENT)
Dept: PHYSICAL THERAPY | Facility: CLINIC | Age: 54
End: 2024-10-03
Payer: OTHER MISCELLANEOUS

## 2024-10-03 DIAGNOSIS — Z98.890 STATUS POST ROTATOR CUFF REPAIR: Primary | ICD-10-CM

## 2024-10-03 DIAGNOSIS — M75.101 TEAR OF RIGHT SUPRASPINATUS TENDON: ICD-10-CM

## 2024-10-03 PROCEDURE — 97140 MANUAL THERAPY 1/> REGIONS: CPT

## 2024-10-03 PROCEDURE — 97110 THERAPEUTIC EXERCISES: CPT

## 2024-10-03 NOTE — PROGRESS NOTES
"Daily Note     Today's date: 10/3/2024  Patient name: Amari Jenkins  : 1970  MRN: [id number]  Referring provider: Belen Tatum P*  Dx:   Encounter Diagnosis     ICD-10-CM    1. Status post rotator cuff repair  Z98.890       2. Tear of right supraspinatus tendon  M75.101           Start Time: 0845  Stop Time: 0930  Total time in clinic (min): 45 minutes    Subjective: Patient reports having a little in the anterior deltoid area.      Objective: See treatment diary below      Assessment: Tolerated treatment well.   Patient participated in skilled PT session focused on strengthening, stretching, and ROM.  Patient able to complete exercise program with no increase pain.  Patient performed exercises to protocol.  Patient would continue to benefit from skilled PT interventions to address strengthening, stretching, and ROM. Patient demonstrated fatigue post treatment and exhibited good technique with therapeutic exercises      Plan: Continue per plan of care.      Precautions: RC repair DOS 24      DATE 10/3 9/20 9/24 9/27 10/1   FOTO    JF   22    MANUALS        Shoulder PROM per protocol     Forward Flexion to 90°, External Rotation to 35° (Elbow at side), Internal Rotation to Body, Abduction to 60° CD 10' AW 10' JF JF JF   Elbow PROM                         Neuro Re-Ed        Scap retraction  20x NV 20 x 20 x 20 x                                                   Ther Ex        Pt education on HEP, POC        Cervical ROM all directions  5\" 10x ea dir x30 30 x  10 x 5 \" all 10 x  5\"   Wrist AROM all directions  2# 30x ea dir x30 30 x 30 x 2#  30 x 2#     Elbow AROM Flex/Ext Supine 30x X30 supine 30 x   supine 30 x supine 30x supine   AROM Pro/Sup 30x NV 30 x 30 x 30 x   Pendulum S/S, F/B 30x ea  x30 30 x 30 x 30x   Gripping  35# 40x 35# x30 35#   30 x 35# 40 x 35#  40 x   Nustep no arms L3 x 5 min   NV 5 min           Ther Activity                        Gait Training                      "   Modalities        CP R Shshyann  10' 10

## 2024-10-08 ENCOUNTER — OFFICE VISIT (OUTPATIENT)
Dept: PHYSICAL THERAPY | Facility: CLINIC | Age: 54
End: 2024-10-08
Payer: OTHER MISCELLANEOUS

## 2024-10-08 DIAGNOSIS — Z98.890 STATUS POST ROTATOR CUFF REPAIR: ICD-10-CM

## 2024-10-08 DIAGNOSIS — M75.101 TEAR OF RIGHT SUPRASPINATUS TENDON: Primary | ICD-10-CM

## 2024-10-08 PROCEDURE — 97110 THERAPEUTIC EXERCISES: CPT

## 2024-10-08 PROCEDURE — 97140 MANUAL THERAPY 1/> REGIONS: CPT

## 2024-10-08 NOTE — PROGRESS NOTES
"Daily Note     Today's date: 10/8/2024  Patient name: Amari Jenkins  : 1970  MRN: [id number]  Referring provider: Belen Tatum P*  Dx:   Encounter Diagnosis     ICD-10-CM    1. Tear of right supraspinatus tendon  M75.101       2. Status post rotator cuff repair  Z98.890           Start Time: 845  Stop Time: 930  Total time in clinic (min): 45 minutes    Subjective:  I see the Dr On Oct 28th. I have some pain over the outside of my upper shoulder.       Objective: See treatment diary below      Assessment: Tolerated treatment well. Patient would benefit from continued PT  pt continues to wear his sling at all times. He did well with his hand exercises today and had good passive motion in his right shoulder today. We continue to follow the protocol by the Dr for motion and exercises.  His portal sites are healing nicely ,. He had some tenderness over those sites with scar massage today,. We ended with a cold pack to his right shoulder post.       Plan: Continue per plan of care.      Precautions: RC repair DOS 24      DATE 10/3 10/8 9/24 9/27 10/1   FOTO  26 JF  JF   22    MANUALS        Shoulder PROM per protocol     Forward Flexion to 90°, External Rotation to 35° (Elbow at side), Internal Rotation to Body, Abduction to 60° CD 10' JF 10' JF JF JF   Elbow PROM                         Neuro Re-Ed        Scap retraction  20x 20 x 20 x 20 x 20 x                                                   Ther Ex        Pt education on HEP, POC        Cervical ROM all directions  5\" 10x ea dir 5\" 10x  30 x  10 x 5 \" all 10 x  5\"   Wrist AROM all directions  2# 30x ea dir 3#  30 x 30 x 30 x 2#  30 x 2#     Elbow AROM Flex/Ext Supine 30x X30 supine 30 x   supine 30 x supine 30x supine   AROM Pro/Sup 30x 30x 30 x 30 x 30 x   Pendulum S/S, F/B 30x ea  x30 30 x 30 x 30x   Gripping  35# 40x 35# x40x 35#   30 x 35# 40 x 35#  40 x   Nustep no arms L3 x 5 min L 3 5 min  NV 5 min           Ther Activity              "           Gait Training                        Modalities        ELENA Goff  10' 10

## 2024-10-10 ENCOUNTER — OFFICE VISIT (OUTPATIENT)
Dept: PHYSICAL THERAPY | Facility: CLINIC | Age: 54
End: 2024-10-10
Payer: OTHER MISCELLANEOUS

## 2024-10-10 DIAGNOSIS — Z98.890 STATUS POST ROTATOR CUFF REPAIR: Primary | ICD-10-CM

## 2024-10-10 DIAGNOSIS — M75.101 TEAR OF RIGHT SUPRASPINATUS TENDON: ICD-10-CM

## 2024-10-10 PROCEDURE — 97140 MANUAL THERAPY 1/> REGIONS: CPT

## 2024-10-10 PROCEDURE — 97110 THERAPEUTIC EXERCISES: CPT

## 2024-10-10 NOTE — PROGRESS NOTES
"Daily Note     Today's date: 10/10/2024  Patient name: Amari Jenkins  : 1970  MRN: [id number]  Referring provider: Belen Tatum P*  Dx:   Encounter Diagnosis     ICD-10-CM    1. Status post rotator cuff repair  Z98.890       2. Tear of right supraspinatus tendon  M75.101           Start Time: 0930  Stop Time: 1015  Total time in clinic (min): 45 minutes    Subjective:  My shoulder is doing ok.  I am still wearing the sling.,       Objective: See treatment diary below      Assessment: Tolerated treatment well. Patient would benefit from continued PT   pt continues to use his sling until his next Dr visit.  Pt did well with his wrist exercises and passive motion to his right shoulder. He reports having little pain through out his session today.  We ended with a cold pack to his right shoulder.       Plan: Continue per plan of care.      Precautions: RC repair DOS 24      DATE 10/3 10/8 10/10 9/27 10/1   FOTO  26 JF  JF   22    MANUALS        Shoulder PROM per protocol     Forward Flexion to 90°, External Rotation to 35° (Elbow at side), Internal Rotation to Body, Abduction to 60° CD 10' JF 10' JF JF JF   Elbow PROM                         Neuro Re-Ed        Scap retraction  20x 20 x 20 x 20 x 20 x   shrugs   20 x                                             Ther Ex        Pt education on HEP, POC        Cervical ROM all directions  5\" 10x ea dir 5\" 10x  30 x  10 x 5 \" all 10 x  5\"   Wrist AROM all directions  2# 30x ea dir 3#  30 x 30 x3#  30 x 2#  30 x 2#     Elbow AROM Flex/Ext Supine 30x X30 supine 30 x   supine 30 x supine 30x supine   AROM Pro/Sup 30x 30x 30 x 30 x 30 x   Pendulum S/S, F/B 30x ea  x30 30 x 30 x 30x   Gripping  35# 40x 35# x40x 35#  40 x 35# 40 x 35#  40 x   Nustep no arms L3 x 5 min L 3 5 min  NV 5 min           Ther Activity                        Gait Training                        Modalities        CP R Shld  10' 10                               "

## 2024-10-15 ENCOUNTER — OFFICE VISIT (OUTPATIENT)
Dept: PHYSICAL THERAPY | Facility: CLINIC | Age: 54
End: 2024-10-15
Payer: OTHER MISCELLANEOUS

## 2024-10-15 DIAGNOSIS — Z98.890 STATUS POST ROTATOR CUFF REPAIR: ICD-10-CM

## 2024-10-15 DIAGNOSIS — M75.101 TEAR OF RIGHT SUPRASPINATUS TENDON: Primary | ICD-10-CM

## 2024-10-15 PROCEDURE — 97112 NEUROMUSCULAR REEDUCATION: CPT

## 2024-10-15 PROCEDURE — 97140 MANUAL THERAPY 1/> REGIONS: CPT

## 2024-10-15 PROCEDURE — 97110 THERAPEUTIC EXERCISES: CPT

## 2024-10-15 NOTE — PROGRESS NOTES
"Daily Note     Today's date: 10/15/2024  Patient name: Amari Jenkins  : 1970  MRN: [id number]  Referring provider: Belen Tatum P*  Dx:   Encounter Diagnosis     ICD-10-CM    1. Tear of right supraspinatus tendon  M75.101       2. Status post rotator cuff repair  Z98.890           Start Time: 0845  Stop Time: 930  Total time in clinic (min): 45 minutes    Subjective:   My shoulder is a bit sore today. I think its the weather .      Objective: See treatment diary below      Assessment: Tolerated treatment well. Patient would benefit from continued PT  pt did well today with his program. We are gradually increasing his PROM with his right shoulder as per the protocol from his DR. He had good passive motion in all planes with some mild pulling. He reports having some mild soreness / throbbing post. We ended with a cold pack for 8 min post.       Plan: Continue per plan of care.      Precautions: RC repair DOS 24      DATE 10/3 10/8 10/10 10/15    FOTO  26 JF      MANUALS        Shoulder PROM per protocol     Forward Flexion to 90°, External Rotation to 35° (Elbow at side), Internal Rotation to Body, Abduction to 60° CD 10' JF 10' JF JF    Elbow PROM                         Neuro Re-Ed        Scap retraction  20x 20 x 20 x 20 x    shrugs   20 x 20x                                            Ther Ex        Pt education on HEP, POC        Cervical ROM all directions  5\" 10x ea dir 5\" 10x  30 x  10 x 5 \" all    Wrist AROM all directions  2# 30x ea dir 3#  30 x 30 x3#  30 x 3 #    Elbow AROM Flex/Ext Supine 30x X30 supine 30 x   supine 30 x supine    AROM Pro/Sup 30x 30x 30 x 30 x    Pendulum S/S, F/B 30x ea  x30 30 x 30 x    Gripping  35# 40x 35# x40x 35#  40 x 35# 40 x    Nustep no arms L3 x 5 min L 3 5 min  NV            Ther Activity                        Gait Training                        Modalities        CP R Shld  10' 10                                 "

## 2024-10-17 ENCOUNTER — EVALUATION (OUTPATIENT)
Dept: PHYSICAL THERAPY | Facility: CLINIC | Age: 54
End: 2024-10-17
Payer: OTHER MISCELLANEOUS

## 2024-10-17 DIAGNOSIS — M75.101 TEAR OF RIGHT SUPRASPINATUS TENDON: Primary | ICD-10-CM

## 2024-10-17 DIAGNOSIS — Z98.890 STATUS POST ROTATOR CUFF REPAIR: ICD-10-CM

## 2024-10-17 PROCEDURE — 97112 NEUROMUSCULAR REEDUCATION: CPT | Performed by: PHYSICAL THERAPIST

## 2024-10-17 PROCEDURE — 97140 MANUAL THERAPY 1/> REGIONS: CPT | Performed by: PHYSICAL THERAPIST

## 2024-10-17 PROCEDURE — 97110 THERAPEUTIC EXERCISES: CPT | Performed by: PHYSICAL THERAPIST

## 2024-10-17 NOTE — PROGRESS NOTES
PT Re-Evaluation     Today's date: 10/17/2024  Patient name: Amari Jenkins  : 1970  MRN: [id number]  Referring provider: Belen Tatum P*  Dx:   Encounter Diagnosis     ICD-10-CM    1. Tear of right supraspinatus tendon  M75.101       2. Status post rotator cuff repair  Z98.890           Start Time: 845  Stop Time: 943  Total time in clinic (min): 58 minutes    Assessment  Impairments: abnormal or restricted ROM, abnormal movement, activity intolerance, impaired physical strength, lacks appropriate home exercise program, pain with function and poor posture   Symptom irritability: low    Assessment details: Patient continues to respond well to therapy interventions focused on passive rom of the R shoulder and arom of the distal joints at this time. He reports more of a stretching sensation than pain during end range passive rom. He is doing quite well with regard to passive stretching. He continues to be on pace with the provided protocol.   Understanding of Dx/Px/POC: good     Prognosis: good    Goals  ST. Independent with HEP in 2 weeks  2. Pt will have verbal report of improvement in symptoms by >/=25% in 2 weeks     To be achieved by D/C   LT. Pt will improve FOTO score by >/= 5 points in 6 weeks  2. Pt will improve FOTO score to >/= 61 by visit # 24  3. Pt will be able to return to work  4. Pt will be able to perform ADLs independently   5. Pt will be able to perform household chores independently   6. Pt will be able to return to recreational activities without restriction   7. Pt will be able to drive with no difficulty       Plan  Patient would benefit from: skilled physical therapy    Planned therapy interventions: activity modification, manual therapy, motor coordination training, neuromuscular re-education, patient education, self care, therapeutic activities, therapeutic exercise, graded activity, home exercise program, graded exercise, functional ROM exercises and  strengthening    Frequency: 2x week  Duration in weeks: 8  Plan of Care beginning date: 10/17/2024  Plan of Care expiration date: 2024  Treatment plan discussed with: patient  Plan details: Patient will continue to benefit from skilled physical therapy to address the functional deficits that were identified during the evaluation today. We will continue to progress the therapy program to address these functional deficits and achieve the established goals.   Patient informed that from this point forward, to ensure adherence to the aforementioned plan of care, all or some of the treatment may be performed and carried out by a Physical Therapy Assistant (PTA) with supervision from a licensed Physical Therapist (PT) in accordance with [facility] Physical Therapy Practice Act.            Subjective Evaluation    History of Present Illness  Mechanism of injury: Pt under went RC repair on 24 for the second time. His original surgery was May 2023. He had no improvement from the surgery and when he was evaluated by a second opinion the tear was still present.     Since the surgery he is continuing to have pain. He notes compl    Pt is a : he would need to be able to lift 75lbs     Update 10/17/2024:  Patient reports pain levels are well controlled. He continues to report compliance to his sling and HEP. He feels that the shoulder is a little stiff but otherwise he is doing quite well. He denies any cardinal signs of infection.     Patient Goals  Patient goals for therapy: decreased pain and return to work  Patient goal: be able to play frisbee with his dogs, be able to ride his ATV,  be able to go camping,  Pain  Current pain ratin  At best pain ratin  At worst pain rating: 3  Location: R shoulder  Quality: discomfort and dull ache  Relieving factors: ice, medications, change in position, support and rest    Hand dominance: right          Objective     Active Range of Motion  "  Cervical/Thoracic Spine       Cervical    Flexion:  WFL  Extension:  Restriction level: moderate  Left lateral flexion:  Restriction level: minimal  Right lateral flexion:  Restriction level minimal  Left rotation:  Restriction level: minimal  Right rotation:  Restriction level: minimal  Left Shoulder   Normal active range of motion    Right Wrist   Normal active range of motion    Additional Active Range of Motion Details  Did not test due to post op    Passive Range of Motion   Left Shoulder   Normal passive range of motion    Right Shoulder   Flexion: 107 degrees   Abduction: 101 degrees   External rotation 45°: 47 degrees   Internal rotation 45°: 55 degrees     Strength/Myotome Testing     Left Shoulder   Normal muscle strength    Additional Strength Details  Did not test due to post operative status              Precautions: RC repair DOS 9/11/24      DATE 10/3 10/8 10/10 10/15 10/17 Reassess   FOTO  26 JF      MANUALS        Shoulder PROM per protocol  CD 10' JF 10' JF JF 10 min SC   Elbow PROM                         Neuro Re-Ed        Scap retraction  20x 20 x 20 x 20 x 30x   shrugs   20 x 20x 30x                                           Ther Ex        Pt education on HEP, POC        Cervical ROM all directions  5\" 10x ea dir 5\" 10x  30 x  10 x 5 \" all    Wrist AROM all directions  2# 30x ea dir 3#  30 x 30 x3#  30 x 3 # 3# 30x ea.    Elbow AROM Flex/Ext Supine 30x X30 supine 30 x   supine 30 x supine Standing 30x   AROM Pro/Sup 30x 30x 30 x 30 x 30x   Pendulum S/S, F/B 30x ea  x30 30 x 30 x 30x   Gripping  35# 40x 35# x40x 35#  40 x 35# 40 x 35# 30x   Nustep no arms L3 x 5 min L 3 5 min  NV L5            Ther Activity                        Gait Training                        Modalities        CP R Shld  10' 10   10 min post              Phase 1 (Weeks 0-6)              Immediate Postoperative Period              Goals:                          Diminish Pain and Inflammation  Maintain and Protect " Integrity of the Repair                          Gradually Increase Passive ROM (NO Active or Active Assist until Week 6)                          Become Independent with Modified ADLs              Precautions:  Maintain Arm in Abduction Sling, Remove Only for Directed Exercises (may remove Abduction Pillow after Day 21 for comfort)                          Keep Incisions Clean & Dry (okay to shower in 48 hours, band-aids over incisions)  No Immersion (pool) until Wounds Totally Sealed (usually not prior to day #10)  Passive Shoulder Motion ONLY, No Lifting/Holding Objects, Reaching Behind Back  Okay to Type/Write at Desktop with Arm in Sling              Day 0-6                          Elbow, Wrist, Hand AROM Exercises                           Start Cervical AROM and Scapula Isometrics                          Cryotherapy/Ice for Pain and Inflammation                          Instruct in Hygiene, Posture, and Positioning               Day 7-28                          Continue Above  May Start Pendulum Exercises                          May Start Supine, Pain Free, PT assisted PROM  Forward Flexion to 90°, External Rotation to 35° (Elbow at side), Internal Rotation to Body, Abduction to 60°                          Can Introduce light Cardio (Walking, Stationary Bike)                          Aqua Therapy may begin at week 3 (day 21) as long as no wound problems              Day 29-42                          Continue Above  Progress PROM to Goal of full PROM by Week 6.  May add Gentle Mobilizations (GH and Scapulothoracic) to Regain full PROM if Needed.  May add Heat prior to PROM Exercises, Ice after Exercises  May Begin AAROM at Day 29 if ROM is Appropriate in Anticipation of AROM Starting at Week 6              Criteria to Progress to Phase 2                          Reasonable Passive Forward Flexion, Abduction , IR/ER                          Time  Phase 2 (Weeks 6-12)              Protection and Active  Motion              Goals of Phase:                          Allow Healing of Soft Tissues                          Decrease Pain and Inflammation  Add ADLs and Regain AROM by End of Phase              Precautions:                          NO STRENGTHENING until Phase 3                          Repair is Most Prone to Failure during this Phase!                          No Lifting Objects > 2 lbs (Coffee Cup OK), no Sudden Motions                          Avoid Upper Extremity Bike and Ergometer              Day 43-56                          Discontinue Sling  Initiate AROM Exercises (forward flexion, ER, IR and abduction), Rotator Cuff Isometrics                          Continue Periscapular Exercises, add Stretching if PROM Lacking   No Strengthening until Week 12 (Minimum Time Needed for Cuff Healing Sufficient to withstand Strengthening)                Phase 3 (Weeks 12-16)              Early Strengthening              Goal of Phase:                          Gain full AROM, Maintain PROM                          Gradual return of Shoulder Strength, Power and Endurance                          Gradual return to Functional Activities              Precautions:                          No Lifting > 10lbs, Sudden Lifting or Pushing activities, Overhead Lifting                          No Upper Extremity Bike or Ergometer              Week 12                          Initiate Strengthening Program (10 lb Maximum until Phase 4)                            ER/IR with Bands (Standing)                            ER in Lateral Decubitius Position                            Lateral Raises                            Full Can in Scapular Plane                            Prone Rowing, Horizontal Abduction, Extension                            Elbow Flexion/Extension              Week 14-16                          Initiate light Functional Activities as Permitted  Progress to Fundamental Shoulder Exercises  Phase 4 (Variable  but Weeks 16-24)              Aggressive Rehab  Sport Specific or Activity Specific               Goals of Phase:                          Maintain Full Pain-free AROM                          Advanced Conditioning Exercises for enhanced Functional use                          Continue regaining Shoulder Strength, Power and Endurance                          Eventual return to full Functional Activities              Precautions:                          None              Week 16                          Continue ROM and stretching if appropriate                          Progress Strengthening, Proprioceptive and Neuromuscular Training                          Light Sports if Progressing Well (Chipping/Putting, easy ground strokes etc.)              Week 20                          Continue Strengthening and Stretching                          Initiate Interval Sports Program as Appropriate

## 2024-10-22 ENCOUNTER — OFFICE VISIT (OUTPATIENT)
Dept: PHYSICAL THERAPY | Facility: CLINIC | Age: 54
End: 2024-10-22
Payer: OTHER MISCELLANEOUS

## 2024-10-22 DIAGNOSIS — Z98.890 STATUS POST ROTATOR CUFF REPAIR: ICD-10-CM

## 2024-10-22 DIAGNOSIS — M75.101 TEAR OF RIGHT SUPRASPINATUS TENDON: Primary | ICD-10-CM

## 2024-10-22 PROCEDURE — 97110 THERAPEUTIC EXERCISES: CPT | Performed by: PHYSICAL THERAPIST

## 2024-10-22 PROCEDURE — 97112 NEUROMUSCULAR REEDUCATION: CPT | Performed by: PHYSICAL THERAPIST

## 2024-10-22 PROCEDURE — 97140 MANUAL THERAPY 1/> REGIONS: CPT | Performed by: PHYSICAL THERAPIST

## 2024-10-22 NOTE — PROGRESS NOTES
"Daily Note     Today's date: 10/22/2024  Patient name: Amari Jenkins  : 1970  MRN: [id number]  Referring provider: Belen Tatum P*  Dx:   Encounter Diagnosis     ICD-10-CM    1. Tear of right supraspinatus tendon  M75.101       2. Status post rotator cuff repair  Z98.890           Start Time: 845  Stop Time: 930  Total time in clinic (min): 45 minutes    Subjective: Patient reports that his shoulder was sore but not painful after his last session. He notes that he has been compliant with his sling and exercises at home.       Objective: See treatment diary below      Assessment: Tolerated treatment well. Patient demonstrated fatigue post treatment, exhibited good technique with therapeutic exercises, and would benefit from continued PTPatient noted mild soreness with therapy interventions today. Added table slides for flexion to continue to progress passive rom of the R shoulder.       Plan: Continue per plan of care.  Progress treatment as tolerated.   Progress treament per protocol.      Precautions: RC repair DOS 24      DATE 10/22 10/8 10/10 10/15 10/17 Reassess   FOTO  26 JF      MANUALS        Shoulder PROM per protocol  10 min SC JF 10' JF JF 10 min SC   Elbow PROM                         Neuro Re-Ed        Scap retraction  30x 20 x 20 x 20 x 30x   shrugs 30x  20 x 20x 30x                                           Ther Ex        Pt education on HEP, POC        Cervical ROM all directions   5\" 10x  30 x  10 x 5 \" all    Wrist AROM all directions  4# 30x 3#  30 x 30 x3#  30 x 3 # 3# 30x ea.    Elbow AROM Flex/Ext 30x X30 supine 30 x   supine 30 x supine Standing 30x   AROM Pro/Sup 30x 30x 30 x 30 x 30x   Pendulum S/S, F/B 30x x30 30 x 30 x 30x   Gripping  35# 30x 35# x40x 35#  40 x 35# 40 x 35# 30x   Nustep no arms NT L 3 5 min  NV L5    Table slide flexion 5\" 15x       Ther Activity                        Gait Training                        Modalities        CP R Shld  10' 10   10 min " post

## 2024-10-24 ENCOUNTER — OFFICE VISIT (OUTPATIENT)
Dept: PHYSICAL THERAPY | Facility: CLINIC | Age: 54
End: 2024-10-24
Payer: OTHER MISCELLANEOUS

## 2024-10-24 DIAGNOSIS — M75.101 TEAR OF RIGHT SUPRASPINATUS TENDON: ICD-10-CM

## 2024-10-24 DIAGNOSIS — Z98.890 STATUS POST ROTATOR CUFF REPAIR: Primary | ICD-10-CM

## 2024-10-24 PROCEDURE — 97112 NEUROMUSCULAR REEDUCATION: CPT

## 2024-10-24 PROCEDURE — 97110 THERAPEUTIC EXERCISES: CPT

## 2024-10-24 PROCEDURE — 97140 MANUAL THERAPY 1/> REGIONS: CPT

## 2024-10-24 NOTE — PROGRESS NOTES
"Daily Note     Today's date: 10/24/2024  Patient name: Amari Jenkins  : 1970  MRN: [id number]  Referring provider: Belen Tatum P*  Dx:   Encounter Diagnosis     ICD-10-CM    1. Status post rotator cuff repair  Z98.890       2. Tear of right supraspinatus tendon  M75.101           Start Time: 0845  Stop Time: 930  Total time in clinic (min): 45 minutes    Subjective:  My shoulder is feeling pretty good.,  I see the Dr next week.       Objective: See treatment diary below      Assessment: Tolerated treatment well. Patient would benefit from continued PT   pt reports having some very mild discomfort at times during therapy today. He did well with the new exercises today . He had good passive motion today with in the protocol per . We ended with a cold pack to his right shoulder post.       Plan: Continue per plan of care.      Precautions: RC repair DOS 24      DATE 10/22 10/24 10/10 10/15 10/17 Reassess   FOTO        MANUALS        Shoulder PROM per protocol  10 min SC JF 10' JF JF 10 min SC   Elbow PROM                         Neuro Re-Ed        Scap retraction  30x 20 x 20 x 20 x 30x   shrugs 30x 30x 20 x 20x 30x                                           Ther Ex        Pt education on HEP, POC        Cervical ROM all directions    30 x  10 x 5 \" all    Wrist AROM all directions  4# 30x 4#  30 x 30 x3#  30 x 3 # 3# 30x ea.    Elbow AROM Flex/Ext 30x X30 supine 30 x   supine 30 x supine Standing 30x   AROM Pro/Sup 30x 30x 30 x 30 x 30x   Pendulum S/S, F/B 30x x30 30 x 30 x 30x   Wand press/ flex  10x ea      Gripping  35# 30x 35# x40x 35#  40 x 35# 40 x 35# 30x   Nustep no arms NT L 3 5 min  NV L5    pulleys  5\" 10 x      Table slide flexion 5\" 15x 5\" 15 x      Ther Activity                        Gait Training                        Modalities        CP R Shld  10' 10   10 min post                  "

## 2024-10-28 ENCOUNTER — OFFICE VISIT (OUTPATIENT)
Dept: OBGYN CLINIC | Facility: OTHER | Age: 54
End: 2024-10-28

## 2024-10-28 VITALS
HEART RATE: 104 BPM | DIASTOLIC BLOOD PRESSURE: 84 MMHG | WEIGHT: 197 LBS | BODY MASS INDEX: 28.2 KG/M2 | SYSTOLIC BLOOD PRESSURE: 137 MMHG | HEIGHT: 70 IN

## 2024-10-28 DIAGNOSIS — Z98.890 S/P RIGHT ROTATOR CUFF REPAIR: Primary | ICD-10-CM

## 2024-10-28 PROCEDURE — 99024 POSTOP FOLLOW-UP VISIT: CPT | Performed by: PHYSICIAN ASSISTANT

## 2024-10-28 NOTE — LETTER
"October 28, 2024     Amari Colin MD  [address]  [address]  [facility] [address]    Patient: Amari Jenkins   YOB: 1970   Date of Visit: 10/28/2024       Dear Dr. Colin:    Thank you for caring for mutual patient Amari Jenkins. Below are my notes for his most recent office visit.    If you have questions, please do not hesitate to call me. I look forward to following your patient along with you.         Sincerely,        Belen Tatum PA-C        CC: No Recipients    Belen Tatum PA-C  10/28/2024  2:44 PM  Sign when Signing Visit  Surgery: SARS w/revision rotator cuff repair on 9/11/24    S: Patient is doing well.  They have been compliant with formal PT and the HEP.  Pleased with progress.  Sling was discontinued last week - still uses occasionally when out of the house.  Denies new injury or trauma    /84   Pulse 104   Ht 5' 10\" (1.778 m)   Wt 89.4 kg (197 lb)   BMI 28.27 kg/m²     O: RIGHT shoulder  FF: 170  Abd: >90  ER: equivalent  IR: Upper lumbar  ER strength: not tested  Good elbow, wrist and hand range of motion  Skin - warm and dry  SI  NVI    A/P: 6 weeks following arthroscopic right shoulder revision rotator cuff repair  Continue with formal physical therapy - following standard rotator cuff repair protocol  HEP - per therapy.    Pain control - Tylenol 1000 mg every 8 hours  Ice as needed - 20 minutes on and 20 minutes off  Follow up in 6-8 weeks   Work restrictions - per Dr Colin  "

## 2024-10-28 NOTE — PROGRESS NOTES
"Surgery: SARS w/revision rotator cuff repair on 9/11/24    S: Patient is doing well.  They have been compliant with formal PT and the HEP.  Pleased with progress.  Sling was discontinued last week - still uses occasionally when out of the house.  Denies new injury or trauma    /84   Pulse 104   Ht 5' 10\" (1.778 m)   Wt 89.4 kg (197 lb)   BMI 28.27 kg/m²     O: RIGHT shoulder  FF: 170  Abd: >90  ER: equivalent  IR: Upper lumbar  ER strength: not tested  Good elbow, wrist and hand range of motion  Skin - warm and dry  SI  NVI    A/P: 6 weeks following arthroscopic right shoulder revision rotator cuff repair  Continue with formal physical therapy - following standard rotator cuff repair protocol  Discontinue sling  HEP - per therapy.    Pain control - Tylenol 1000 mg every 8 hours  Ice as needed - continue with ice machine  Follow up in 6-8 weeks   Work restrictions - per Dr Colin  "

## 2024-10-29 ENCOUNTER — OFFICE VISIT (OUTPATIENT)
Dept: PHYSICAL THERAPY | Facility: CLINIC | Age: 54
End: 2024-10-29
Payer: OTHER MISCELLANEOUS

## 2024-10-29 DIAGNOSIS — Z98.890 STATUS POST ROTATOR CUFF REPAIR: ICD-10-CM

## 2024-10-29 DIAGNOSIS — M75.101 TEAR OF RIGHT SUPRASPINATUS TENDON: Primary | ICD-10-CM

## 2024-10-29 PROCEDURE — 97110 THERAPEUTIC EXERCISES: CPT

## 2024-10-29 PROCEDURE — 97140 MANUAL THERAPY 1/> REGIONS: CPT

## 2024-10-29 PROCEDURE — 97112 NEUROMUSCULAR REEDUCATION: CPT

## 2024-10-29 NOTE — PROGRESS NOTES
"Daily Note     Today's date: 10/29/2024  Patient name: Amari Jenkins  : 1970  MRN: [id number]  Referring provider: Belen Tatum P*  Dx:   Encounter Diagnosis     ICD-10-CM    1. Tear of right supraspinatus tendon  M75.101       2. Status post rotator cuff repair  Z98.890           Start Time: 08  Stop Time: 940  Total time in clinic (min): 55 minutes    Subjective:  I saw the Dr and he was pleased with my progress. I am a bit sore being out of the sling.       Objective: See treatment diary below      Assessment: Tolerated treatment well. Patient would benefit from continued PT    pt did well today with his program. He reports having no pain through out his session.  He had good passive motion today and had no issues with the wand exercises.  He states having no pain noted post therapy. We ended with a cold pack to his right shoulder.       Plan: Continue per plan of care.      Precautions: RC repair DOS 24      DATE 10/22 10/24 10/29 10/15 10/17 Reassess   FOTO        MANUALS   JF       Shoulder PROM per protocol  10 min SC JF 10' JF JF 10 min SC   Elbow PROM                         Neuro Re-Ed        Scap retraction  30x 20 x 30 x 20 x 30x   shrugs 30x 30x 30 x 20x 30x                                           Ther Ex        Pt education on HEP, POC        Cervical ROM all directions         Wrist AROM all directions  4# 30x 4#  30 x 30 x3#  30 x 3 # 3# 30x ea.    Elbow AROM Flex/Ext 30x X30 supine 30 x    30 x supine Standing 30x   AROM Pro/Sup 30x 30x 30 x 30 x 30x   Pendulum S/S, F/B 30x x30 30 x 30 x 30x   Wand press/ flex  10x ea 10 x     Gripping  35# 30x 35# x40x 35#  40 x 35# 40 x 35# 30x   Nustep no arms NT L 3 5 min L 4  5 min  NV L5    pulleys  5\" 10 x 5\" 10 x     Table slide flexion /abd 5\" 15x 5\" 15 x 5\" 10 x     Ther Activity                        Gait Training                        Modalities        CP R Shld  10' 10   10 min post                    "

## 2024-10-31 ENCOUNTER — OFFICE VISIT (OUTPATIENT)
Dept: PHYSICAL THERAPY | Facility: CLINIC | Age: 54
End: 2024-10-31
Payer: OTHER MISCELLANEOUS

## 2024-10-31 DIAGNOSIS — Z98.890 STATUS POST ROTATOR CUFF REPAIR: Primary | ICD-10-CM

## 2024-10-31 DIAGNOSIS — M75.101 TEAR OF RIGHT SUPRASPINATUS TENDON: ICD-10-CM

## 2024-10-31 PROCEDURE — 97110 THERAPEUTIC EXERCISES: CPT

## 2024-10-31 PROCEDURE — 97140 MANUAL THERAPY 1/> REGIONS: CPT

## 2024-10-31 PROCEDURE — 97112 NEUROMUSCULAR REEDUCATION: CPT

## 2024-10-31 NOTE — PROGRESS NOTES
"Daily Note     Today's date: 10/31/2024  Patient name: Amari Jenkins  : 1970  MRN: [id number]  Referring provider: Belen Tatum P*  Dx:   Encounter Diagnosis     ICD-10-CM    1. Status post rotator cuff repair  Z98.890       2. Tear of right supraspinatus tendon  M75.101           Start Time: 0845  Stop Time: 930  Total time in clinic (min): 45 minutes    Subjective:  I am a bit sore today., I had to drive a few hours the other day.,      Objective: See treatment diary below      Assessment: Tolerated treatment well. Patient would benefit from continued PT   pt did well with his shoulder exercises today. He reports having no pain with pulleys or table slides today. His passive motion is good and is where he needs to be as per the Dr protocol.  Pt reports having no pain post in his right shoulder.  We ended with a cold pack to his right shoulder.       Plan: Continue per plan of care.      Precautions: RC repair DOS 24      DATE 10/22 10/24 10/29 10/31 10/17 Reassess   FOTO        MANUALS   JF       Shoulder PROM per protocol  10 min SC JF 10' JF JF 10 min SC   Elbow PROM                         Neuro Re-Ed        Scap retraction  30x 20 x 30 x 30 x 30x   shrugs 30x 30x 30 x 30 x 30x                                           Ther Ex        Pt education on HEP, POC        Cervical ROM all directions         Wrist AROM all directions  4# 30x 4#  30 x 30 x3#  30 x 3# 3# 30x ea.    Elbow AROM Flex/Ext 30x X30 supine 30 x    30 x supine Standing 30x   AROM Pro/Sup 30x 30x 30 x 30 x 30x   Pendulum S/S, F/B 30x x30 30 x 30 x 30x   Wand press/ flex  10x ea 10 x 15x    Gripping  35# 30x 35# x40x 35#  40 x 35# 40 x 35# 30x   Nustep no arms NT L 3 5 min L 4  5 min   L 4 5 min L5    pulleys  5\" 10 x 5\" 10 x 5\" 10 x    Table slide flexion /abd 5\" 15x 5\" 15 x 5\" 10 x 5\" 10 x    Ther Activity                        Gait Training                        Modalities        CP R Shld  10' 10   10 min post         "

## 2024-11-05 ENCOUNTER — OFFICE VISIT (OUTPATIENT)
Dept: PHYSICAL THERAPY | Facility: CLINIC | Age: 54
End: 2024-11-05
Payer: OTHER MISCELLANEOUS

## 2024-11-05 DIAGNOSIS — M75.101 TEAR OF RIGHT SUPRASPINATUS TENDON: Primary | ICD-10-CM

## 2024-11-05 DIAGNOSIS — Z98.890 STATUS POST ROTATOR CUFF REPAIR: ICD-10-CM

## 2024-11-05 PROCEDURE — 97140 MANUAL THERAPY 1/> REGIONS: CPT

## 2024-11-05 PROCEDURE — 97112 NEUROMUSCULAR REEDUCATION: CPT

## 2024-11-05 PROCEDURE — 97110 THERAPEUTIC EXERCISES: CPT

## 2024-11-05 NOTE — PROGRESS NOTES
"Daily Note     Today's date: 2024  Patient name: Amari Jenkins  : 1970  MRN: [id number]  Referring provider: Belen Tatum P*  Dx:   Encounter Diagnosis     ICD-10-CM    1. Tear of right supraspinatus tendon  M75.101       2. Status post rotator cuff repair  Z98.890           Start Time: 930  Stop Time: 1015  Total time in clinic (min): 45 minutes    Subjective: I get sore at times , but, I am feeling pretty good. I am sleeping good at night  and the shoulder doesn't bother me much,.      Objective: See treatment diary below      Assessment: Tolerated treatment well. Patient would benefit from continued PT   pt did well with his program today with little to no pain.  He had very good passive motion today with no pain. We continue to follow his protocol as per .  Pt was going to ice at home.       Plan: Continue per plan of care.      Precautions: RC repair DOS 24      DATE 10/22 10/24 10/29 10/31 11/5   FOTO        MANUALS   JF       Shoulder PROM per protocol  10 min SC JF 10' JF JF 10 min JF   Elbow PROM                         Neuro Re-Ed        Scap retraction  30x 20 x 30 x 30 x 30x   shrugs 30x 30x 30 x 30 x 30x                                           Ther Ex        Pt education on HEP, POC        Cervical ROM all directions         Wrist AROM all directions  4# 30x 4#  30 x 30 x3#  30 x 3# 3# 30x ea.    Elbow AROM Flex/Ext 30x X30 supine 30 x    30 x supine Standing 30x   AROM Pro/Sup 30x 30x 30 x 30 x 30x   Pendulum S/S, F/B 30x x30 30 x 30 x 30x   Wand press/ flex  10x ea 10 x 15x    Gripping  35# 30x 35# x40x 35#  40 x 35# 40 x 35# 30x   Nustep no arms NT L 3 5 min L 4  5 min   L 4 5 min L5    pulleys  5\" 10 x 5\" 10 x 5\" 10 x    Table slide flexion /abd 5\" 15x 5\" 15 x 5\" 10 x 5\" 10 x    Ther Activity                        Gait Training                        Modalities        CP R Shld  10' 10   10 min post                        "

## 2024-11-07 ENCOUNTER — OFFICE VISIT (OUTPATIENT)
Dept: PHYSICAL THERAPY | Facility: CLINIC | Age: 54
End: 2024-11-07
Payer: OTHER MISCELLANEOUS

## 2024-11-07 DIAGNOSIS — Z98.890 STATUS POST ROTATOR CUFF REPAIR: Primary | ICD-10-CM

## 2024-11-07 DIAGNOSIS — M75.101 TEAR OF RIGHT SUPRASPINATUS TENDON: ICD-10-CM

## 2024-11-07 PROCEDURE — 97112 NEUROMUSCULAR REEDUCATION: CPT

## 2024-11-07 PROCEDURE — 97110 THERAPEUTIC EXERCISES: CPT

## 2024-11-07 PROCEDURE — 97140 MANUAL THERAPY 1/> REGIONS: CPT

## 2024-11-07 NOTE — PROGRESS NOTES
"Daily Note     Today's date: 2024  Patient name: Amari Jenkins  : 1970  MRN: [id number]  Referring provider: Belen Tatum P*  Dx:   Encounter Diagnosis     ICD-10-CM    1. Status post rotator cuff repair  Z98.890       2. Tear of right supraspinatus tendon  M75.101           Start Time: 930  Stop Time: 1015  Total time in clinic (min): 45 minutes    Subjective:  My shoulder is feeling good.       Objective: See treatment diary below      Assessment: Tolerated treatment well. Patient would benefit from continued PT   pt began some gentle isometics today with little discomfort noted.  He did well with his outlined exercises . He states that over all he had very little pain .  He had good tolerance with the passive motion to his right shoulder today.  We ended with a cold pack to his right shoulder.       Plan: Continue per plan of care.      Precautions: RC repair DOS 24      DATE 11/7 10/24 10/29 10/31 11/5   FOTO        MANUALS   JF       Shoulder PROM per protocol  10 min JF 10' JF JF 10 min JF   Elbow PROM                         Neuro Re-Ed        Scap retraction  30x 20 x 30 x 30 x 30x   shrugs 30x 30x 30 x 30 x 30x                                           Ther Ex                Supine abd/ circles NV       Wrist AROM all directions  4# 30x 4#  30 x 30 x3#  30 x 3# 3# 30x ea.    Elbow AROM Flex/Ext 30x X30 supine 30 x    30 x supine Standing 30x   AROM Pro/Sup 30x 30x 30 x 30 x 30x   Pendulum S/S, F/B 30x x30 30 x 30 x 30x   Wand press/ flex 20 x  10x ea 10 x 15x    Gripping  35# 30x 35# x40x 35#  40 x 35# 40 x 35# 30x   Nustep no arms L 5  5 min L 3 5 min L 4  5 min   L 4 5 min L5    pulleys 5\" 10 x 5\" 10 x 5\" 10 x 5\" 10 x    Table slide flexion /abd 5\" 15x 5\" 15 x 5\" 10 x 5\" 10 x    Ther Activity                        Gait Training                        Modalities        CP R Shld  10' 10   10 min post                          "

## 2024-11-12 ENCOUNTER — OFFICE VISIT (OUTPATIENT)
Dept: PHYSICAL THERAPY | Facility: CLINIC | Age: 54
End: 2024-11-12
Payer: OTHER MISCELLANEOUS

## 2024-11-12 DIAGNOSIS — Z98.890 STATUS POST ROTATOR CUFF REPAIR: ICD-10-CM

## 2024-11-12 DIAGNOSIS — M75.101 TEAR OF RIGHT SUPRASPINATUS TENDON: Primary | ICD-10-CM

## 2024-11-12 PROCEDURE — 97112 NEUROMUSCULAR REEDUCATION: CPT

## 2024-11-12 PROCEDURE — 97110 THERAPEUTIC EXERCISES: CPT

## 2024-11-14 ENCOUNTER — OFFICE VISIT (OUTPATIENT)
Dept: PHYSICAL THERAPY | Facility: CLINIC | Age: 54
End: 2024-11-14
Payer: OTHER MISCELLANEOUS

## 2024-11-14 DIAGNOSIS — Z98.890 STATUS POST ROTATOR CUFF REPAIR: ICD-10-CM

## 2024-11-14 DIAGNOSIS — M75.101 TEAR OF RIGHT SUPRASPINATUS TENDON: Primary | ICD-10-CM

## 2024-11-14 PROCEDURE — 97110 THERAPEUTIC EXERCISES: CPT

## 2024-11-14 PROCEDURE — 97010 HOT OR COLD PACKS THERAPY: CPT

## 2024-11-14 PROCEDURE — 97140 MANUAL THERAPY 1/> REGIONS: CPT

## 2024-11-14 NOTE — PROGRESS NOTES
"Daily Note     Today's date: 2024  Patient name: Amari Jenkins  : 1970  MRN: [id number]  Referring provider: Belen Tatum P*  Dx:   Encounter Diagnosis     ICD-10-CM    1. Tear of right supraspinatus tendon  M75.101       2. Status post rotator cuff repair  Z98.890                      Subjective: Shoulder is feeling good this morning      Objective: See treatment diary below      Assessment: Tolerated treatment well. Patient demonstrated fatigue post treatment.  Challenged with iso ER/IR and wall slides this visit.  Cotninue to progress per protocol leading up to next MD visit       Plan: Continue per plan of care.      Precautions: RC repair DOS 24      DATE 11/7 11/12 11/14 10/31 11/5   FOTO  JF  36      MANUALS        Shoulder PROM per protocol  10 min JF 10' TS 12' JF 10 min JF   Elbow PROM                         Neuro Re-Ed        Scap retraction  30x NV 20x  30 x 30x   shrugs 30x NV 20x 30 x 30x                                           Ther Ex                Supine abd/ circles NV 20 x      Wrist AROM all directions  4# 30x 4#  30 x  30 x 3# 3# 30x ea.    Elbow AROM Flex/Ext 30x X30 supine Standing 3# 30 x supine Standing 30x   AROM Pro/Sup 30x 30x  30 x 30x   Pendulum S/S, F/B 30x x30 30 ea 30 x 30x   Wand press/ flex 20 x  20x ea 20x 15x    Gripping  35# 30x 35# x40x  35# 40 x 35# 30x   Nustep no arms L 5  5 min L 3 5 min   L 4 5 min L5    pulleys 5\" 10 x 5\" 10 x 5\" 10x  5\" 10 x    Flex bar red  15x      Table slide flexion /abd 5\" 15x 5\" 15 x Wall slide flexion 10x 5\" 10 x    Standing Abduction with cane        Iso ER   10x5\"     Iso IR   10x5\"     Ther Activity                        Gait Training                        Modalities        CP R Shld  10' 10' TS  10 min post                              "

## 2024-11-19 ENCOUNTER — OFFICE VISIT (OUTPATIENT)
Dept: PHYSICAL THERAPY | Facility: CLINIC | Age: 54
End: 2024-11-19
Payer: OTHER MISCELLANEOUS

## 2024-11-19 DIAGNOSIS — Z98.890 STATUS POST ROTATOR CUFF REPAIR: ICD-10-CM

## 2024-11-19 DIAGNOSIS — M75.101 TEAR OF RIGHT SUPRASPINATUS TENDON: Primary | ICD-10-CM

## 2024-11-19 PROCEDURE — 97110 THERAPEUTIC EXERCISES: CPT | Performed by: PHYSICAL THERAPIST

## 2024-11-19 PROCEDURE — 97112 NEUROMUSCULAR REEDUCATION: CPT | Performed by: PHYSICAL THERAPIST

## 2024-11-19 PROCEDURE — 97140 MANUAL THERAPY 1/> REGIONS: CPT | Performed by: PHYSICAL THERAPIST

## 2024-11-19 NOTE — PROGRESS NOTES
"Daily Note     Today's date: 2024  Patient name: Amari Jenkins  : 1970  MRN: [id number]  Referring provider: Belen Tatum P*  Dx:   Encounter Diagnosis     ICD-10-CM    1. Tear of right supraspinatus tendon  M75.101       2. Status post rotator cuff repair  Z98.890           Start Time: 930  Stop Time: 1026  Total time in clinic (min): 56 minutes    Subjective: Patient reports that his shoulder continues to be doing well and that he has been sleeping better as long as he does not end up rolling onto his R side.       Objective: See treatment diary below      Assessment: Tolerated treatment well. Patient demonstrated fatigue post treatment, exhibited good technique with therapeutic exercises, and would benefit from continued PT  Patient continues to respond well to therapy interventions. His arom continues to show improvements as noted throughout the session today. He has reports of fatigue with isometric exercises today.       Plan: Continue per plan of care.  Progress treatment as tolerated.       Precautions: RC repair DOS 24      DATE    FOTO  JF  36      MANUALS        Shoulder PROM per protocol  10 min JF 10' TS 12' 10 min SC 10 min JF   Elbow PROM                         Neuro Re-Ed        Scap retraction  30x NV 20x  20x 30x   shrugs 30x NV 20x 20x 30x                                           Ther Ex                Supine abd/ circles NV 20 x      Wrist AROM all directions  4# 30x 4#  30 x   3# 30x ea.    Elbow AROM Flex/Ext 30x X30 supine Standing 3# 3# 30x Standing 30x   AROM Pro/Sup 30x 30x   30x   Wand press/ flex 20 x  20x ea 20x 20x ea.     Gripping  35# 30x 35# x40x   35# 30x   Nustep no arms L 5  5 min L 3 5 min   L5    pulleys 5\" 10 x 5\" 10 x 5\" 10x  5\" 15x    Flex bar red  15x      Table slide flexion /abd 5\" 15x 5\" 15 x Wall slide flexion 10x 10x    Standing Abduction with cane        Shoulder isometrics All   10x5\" 5\" 10x ea.        10x5\"   "   Ther Activity                        Gait Training                        Modalities        CP R Shld  10' 10' TS 10 min 10 min post

## 2024-11-21 ENCOUNTER — EVALUATION (OUTPATIENT)
Dept: PHYSICAL THERAPY | Facility: CLINIC | Age: 54
End: 2024-11-21
Payer: OTHER MISCELLANEOUS

## 2024-11-21 DIAGNOSIS — M75.101 TEAR OF RIGHT SUPRASPINATUS TENDON: Primary | ICD-10-CM

## 2024-11-21 DIAGNOSIS — Z98.890 STATUS POST ROTATOR CUFF REPAIR: ICD-10-CM

## 2024-11-21 PROCEDURE — 97140 MANUAL THERAPY 1/> REGIONS: CPT | Performed by: PHYSICAL THERAPIST

## 2024-11-21 PROCEDURE — 97110 THERAPEUTIC EXERCISES: CPT | Performed by: PHYSICAL THERAPIST

## 2024-11-21 PROCEDURE — 97112 NEUROMUSCULAR REEDUCATION: CPT | Performed by: PHYSICAL THERAPIST

## 2024-11-21 NOTE — LETTER
2024    Belen Tatum PA-C  [address] [facility] [place] [address]    Patient: Amari Jenkins   YOB: 1970   Date of Visit: 2024     Encounter Diagnosis     ICD-10-CM    1. Tear of right supraspinatus tendon  M75.101       2. Status post rotator cuff repair  Z98.890           Dear Dr. Tatum:    Thank you for your recent referral of Amari Jenkins. Please review the attached evaluation summary from Amari's recent visit.     Please verify that you agree with the plan of care by signing the attached order.     If you have any questions or concerns, please do not hesitate to call.     I sincerely appreciate the opportunity to share in the care of one of your patients and hope to have another opportunity to work with you in the near future.       Sincerely,    Bolivar Ramirez, PT      Referring Provider:      I certify that I have read the below Plan of Care and certify the need for these services furnished under this plan of treatment while under my care.                    Belen Tatum PA-C  [address] [facility] [place] [address]  Via In Basket          PT Re-Evaluation     Today's date: 2024  Patient name: Amari Jenkins  : 1970  MRN: [id number]  Referring provider: Belen Tatum P*  Dx:   Encounter Diagnosis     ICD-10-CM    1. Tear of right supraspinatus tendon  M75.101       2. Status post rotator cuff repair  Z98.890           Start Time: 09  Stop Time: 1043  Total time in clinic (min): 71 minutes    Assessment  Impairments: abnormal or restricted ROM, abnormal movement, activity intolerance, impaired physical strength, lacks appropriate home exercise program and pain with function  Symptom irritability: low    Assessment details: Patient has attended 20 physical therapy visits to date. He is continuing to show good progress with R shoulder rom both passively and actively. He presents with strength deficits of the R shoulder which are appropriate at  this time secondary to his post operative status. There was discomfort noted to the superior shoulder at end range of passive movements and with active flexion. He felt some clicking in the anterior shoulder which was sore but not painful during active shoulder flexion when supine but not during bend forward shoulder flexion.   Understanding of Dx/Px/POC: good     Prognosis: good    Goals  ST. Independent with HEP in 2 weeks - MET  2. Pt will have verbal report of improvement in symptoms by >/=25% in 2 weeks - MET    To be achieved by D/C   LT. Pt will improve FOTO score by >/= 5 points in 6 weeks - Not MET  2. Pt will improve FOTO score to >/= 61 by visit # 24 - Not MET  3. Pt will be able to return to work - Not MET  4. Pt will be able to perform ADLs independently - Progressing  5. Pt will be able to perform household chores independently - Not MET  6. Pt will be able to return to recreational activities without restriction - Not MET  7. Pt will be able to drive with no difficulty - MET      Plan  Patient would benefit from: skilled physical therapy    Planned therapy interventions: activity modification, manual therapy, motor coordination training, neuromuscular re-education, patient education, self care, therapeutic activities, therapeutic exercise, graded activity, home exercise program, graded exercise, functional ROM exercises and strengthening    Frequency: 2x week  Duration in weeks: 6  Plan of Care beginning date: 2024  Plan of Care expiration date: 2025  Treatment plan discussed with: patient  Plan details: Patient will continue to benefit from skilled physical therapy to address the functional deficits that were identified during the evaluation today. We will continue to progress the therapy program to address these functional deficits and achieve the established goals.   Patient informed that from this point forward, to ensure adherence to the aforementioned plan of care, all or  some of the treatment may be performed and carried out by a Physical Therapy Assistant (PTA) with supervision from a licensed Physical Therapist (PT) in accordance with [facility] Physical Therapy Practice Act.            Subjective Evaluation    History of Present Illness  Mechanism of injury: Pt under went RC repair on 24 for the second time. His original surgery was May 2023. He had no improvement from the surgery and when he was evaluated by a second opinion the tear was still present.     Since the surgery he is continuing to have pain. He notes compl    Pt is a : he would need to be able to lift 75lbs     Update 10/17/2024:  Patient reports pain levels are well controlled. He continues to report compliance to his sling and HEP. He feels that the shoulder is a little stiff but otherwise he is doing quite well. He denies any cardinal signs of infection.     Update 2024:  Patient reports that his arm is improving. He notes that he still has some soreness with activity but has been refraining from doing any quick movements and heavier lifting. He is still apprehensive to lay on the R shoulder as this will cause irritation to the shoulder.   Patient Goals  Patient goals for therapy: decreased pain and return to work  Patient goal: be able to play frisbee with his dogs, be able to ride his ATV,  be able to go camping,  Pain  Current pain ratin  At best pain ratin  At worst pain rating: 3  Location: R shoulder  Quality: discomfort and dull ache  Relieving factors: ice, medications, change in position, support and rest    Hand dominance: right          Objective     Active Range of Motion   Cervical/Thoracic Spine       Cervical    Flexion:  WFL  Extension:  Restriction level: moderate  Left lateral flexion:  Restriction level: minimal  Right lateral flexion:  Restriction level minimal  Left rotation:  Restriction level: minimal  Right rotation:  Restriction level: minimal  Left  "Shoulder   Normal active range of motion    Right Shoulder   Flexion: 111 degrees     Right Wrist   Normal active range of motion    Passive Range of Motion   Left Shoulder   Normal passive range of motion    Right Shoulder   Flexion: 147 degrees   Abduction: 101 degrees   External rotation 45°: 65 degrees   Internal rotation 45°: 69 degrees     Strength/Myotome Testing     Left Shoulder   Normal muscle strength    Right Shoulder     Planes of Motion   Flexion: 3+   Extension: 3+   Abduction: 3+   Adduction: 3+   External rotation at 0°: 3+   Internal rotation at 0°: 3+              Precautions: RC repair DOS 9/11/24      DATE 11/7 11/12 11/14 11/19 11/21 Reassess   FOTO  JF  36   32 SC   MANUALS        Shoulder PROM per protocol  10 min JF 10' TS 12' 10 min SC 10 min   Elbow PROM                         Neuro Re-Ed        Scap retraction  30x NV 20x  20x 20x    shrugs 30x NV 20x 20x 20x   Bent over flexion and row     20x ea.                                    Ther Ex                Supine abd/ circles NV 20 x      Elbow AROM Flex/Ext 30x X30 supine Standing 3# 3# 30x 3# 30x   AROM Pro/Sup 30x 30x      Wand press/ flex 20 x  20x ea 20x 20x ea.  20x ea   Gripping  35# 30x 35# x40x      Nustep no arms L 5  5 min L 3 5 min      pulleys 5\" 10 x 5\" 10 x 5\" 10x  5\" 15x 10\" 10x   Flex bar red  15x      Table slide flexion /abd 5\" 15x 5\" 15 x Wall slide flexion 10x 10x    Standing Abduction with cane        Shoulder isometrics All   10x5\" 5\" 10x ea.  5\" 10x   Objective updates     10 min   Triceps kickback   10x5\"  20x   Ther Activity                        Gait Training                        Modalities        CP R Shld  10' 10' TS 10 min 10 min              Phase 1 (Weeks 0-6)              Immediate Postoperative Period              Goals:                          Diminish Pain and Inflammation  Maintain and Protect Integrity of the Repair                          Gradually Increase Passive ROM (NO Active or Active " Assist until Week 6)                          Become Independent with Modified ADLs              Precautions:  Maintain Arm in Abduction Sling, Remove Only for Directed Exercises (may remove Abduction Pillow after Day 21 for comfort)                          Keep Incisions Clean & Dry (okay to shower in 48 hours, band-aids over incisions)  No Immersion (pool) until Wounds Totally Sealed (usually not prior to day #10)  Passive Shoulder Motion ONLY, No Lifting/Holding Objects, Reaching Behind Back  Okay to Type/Write at Desktop with Arm in Sling              Day 0-6                          Elbow, Wrist, Hand AROM Exercises                           Start Cervical AROM and Scapula Isometrics                          Cryotherapy/Ice for Pain and Inflammation                          Instruct in Hygiene, Posture, and Positioning               Day 7-28                          Continue Above  May Start Pendulum Exercises                          May Start Supine, Pain Free, PT assisted PROM  Forward Flexion to 90°, External Rotation to 35° (Elbow at side), Internal Rotation to Body, Abduction to 60°                          Can Introduce light Cardio (Walking, Stationary Bike)                          Aqua Therapy may begin at week 3 (day 21) as long as no wound problems              Day 29-42                          Continue Above  Progress PROM to Goal of full PROM by Week 6.  May add Gentle Mobilizations (GH and Scapulothoracic) to Regain full PROM if Needed.  May add Heat prior to PROM Exercises, Ice after Exercises  May Begin AAROM at Day 29 if ROM is Appropriate in Anticipation of AROM Starting at Week 6              Criteria to Progress to Phase 2                          Reasonable Passive Forward Flexion, Abduction , IR/ER                          Time  Phase 2 (Weeks 6-12)              Protection and Active Motion              Goals of Phase:                          Allow Healing of Soft Tissues                           Decrease Pain and Inflammation  Add ADLs and Regain AROM by End of Phase              Precautions:                          NO STRENGTHENING until Phase 3                          Repair is Most Prone to Failure during this Phase!                          No Lifting Objects > 2 lbs (Coffee Cup OK), no Sudden Motions                          Avoid Upper Extremity Bike and Ergometer              Day 43-56                          Discontinue Sling  Initiate AROM Exercises (forward flexion, ER, IR and abduction), Rotator Cuff Isometrics                          Continue Periscapular Exercises, add Stretching if PROM Lacking   No Strengthening until Week 12 (Minimum Time Needed for Cuff Healing Sufficient to withstand Strengthening)                Phase 3 (Weeks 12-16)              Early Strengthening              Goal of Phase:                          Gain full AROM, Maintain PROM                          Gradual return of Shoulder Strength, Power and Endurance                          Gradual return to Functional Activities              Precautions:                          No Lifting > 10lbs, Sudden Lifting or Pushing activities, Overhead Lifting                          No Upper Extremity Bike or Ergometer              Week 12                          Initiate Strengthening Program (10 lb Maximum until Phase 4)                            ER/IR with Bands (Standing)                            ER in Lateral Decubitius Position                            Lateral Raises                            Full Can in Scapular Plane                            Prone Rowing, Horizontal Abduction, Extension                            Elbow Flexion/Extension              Week 14-16                          Initiate light Functional Activities as Permitted  Progress to Fundamental Shoulder Exercises  Phase 4 (Variable but Weeks 16-24)              Aggressive Rehab  Sport Specific or Activity Specific               Goals of  Phase:                          Maintain Full Pain-free AROM                          Advanced Conditioning Exercises for enhanced Functional use                          Continue regaining Shoulder Strength, Power and Endurance                          Eventual return to full Functional Activities              Precautions:                          None              Week 16                          Continue ROM and stretching if appropriate                          Progress Strengthening, Proprioceptive and Neuromuscular Training                          Light Sports if Progressing Well (Chipping/Putting, easy ground strokes etc.)              Week 20                          Continue Strengthening and Stretching                          Initiate Interval Sports Program as Appropriate

## 2024-11-21 NOTE — PROGRESS NOTES
PT Re-Evaluation     Today's date: 2024  Patient name: Amari Jenkins  : 1970  MRN: [id number]  Referring provider: Belen Tatum P*  Dx:   Encounter Diagnosis     ICD-10-CM    1. Tear of right supraspinatus tendon  M75.101       2. Status post rotator cuff repair  Z98.890           Start Time: 932  Stop Time: 1043  Total time in clinic (min): 71 minutes    Assessment  Impairments: abnormal or restricted ROM, abnormal movement, activity intolerance, impaired physical strength, lacks appropriate home exercise program and pain with function  Symptom irritability: low    Assessment details: Patient has attended 20 physical therapy visits to date. He is continuing to show good progress with R shoulder rom both passively and actively. He presents with strength deficits of the R shoulder which are appropriate at this time secondary to his post operative status. There was discomfort noted to the superior shoulder at end range of passive movements and with active flexion. He felt some clicking in the anterior shoulder which was sore but not painful during active shoulder flexion when supine but not during bend forward shoulder flexion.   Understanding of Dx/Px/POC: good     Prognosis: good    Goals  ST. Independent with HEP in 2 weeks - MET  2. Pt will have verbal report of improvement in symptoms by >/=25% in 2 weeks - MET    To be achieved by D/C   LT. Pt will improve FOTO score by >/= 5 points in 6 weeks - Not MET  2. Pt will improve FOTO score to >/= 61 by visit # 24 - Not MET  3. Pt will be able to return to work - Not MET  4. Pt will be able to perform ADLs independently - Progressing  5. Pt will be able to perform household chores independently - Not MET  6. Pt will be able to return to recreational activities without restriction - Not MET  7. Pt will be able to drive with no difficulty - MET      Plan  Patient would benefit from: skilled physical therapy    Planned therapy  interventions: activity modification, manual therapy, motor coordination training, neuromuscular re-education, patient education, self care, therapeutic activities, therapeutic exercise, graded activity, home exercise program, graded exercise, functional ROM exercises and strengthening    Frequency: 2x week  Duration in weeks: 6  Plan of Care beginning date: 11/21/2024  Plan of Care expiration date: 1/2/2025  Treatment plan discussed with: patient  Plan details: Patient will continue to benefit from skilled physical therapy to address the functional deficits that were identified during the evaluation today. We will continue to progress the therapy program to address these functional deficits and achieve the established goals.   Patient informed that from this point forward, to ensure adherence to the aforementioned plan of care, all or some of the treatment may be performed and carried out by a Physical Therapy Assistant (PTA) with supervision from a licensed Physical Therapist (PT) in accordance with [facility] Physical Therapy Practice Act.            Subjective Evaluation    History of Present Illness  Mechanism of injury: Pt under went RC repair on 9/11/24 for the second time. His original surgery was May 2023. He had no improvement from the surgery and when he was evaluated by a second opinion the tear was still present.     Since the surgery he is continuing to have pain. He notes compl    Pt is a : he would need to be able to lift 75lbs     Update 10/17/2024:  Patient reports pain levels are well controlled. He continues to report compliance to his sling and HEP. He feels that the shoulder is a little stiff but otherwise he is doing quite well. He denies any cardinal signs of infection.     Update 11/21/2024:  Patient reports that his arm is improving. He notes that he still has some soreness with activity but has been refraining from doing any quick movements and heavier lifting. He is  still apprehensive to lay on the R shoulder as this will cause irritation to the shoulder.   Patient Goals  Patient goals for therapy: decreased pain and return to work  Patient goal: be able to play frisbee with his dogs, be able to ride his ATV,  be able to go camping,  Pain  Current pain ratin  At best pain ratin  At worst pain rating: 3  Location: R shoulder  Quality: discomfort and dull ache  Relieving factors: ice, medications, change in position, support and rest    Hand dominance: right          Objective     Active Range of Motion   Cervical/Thoracic Spine       Cervical    Flexion:  WFL  Extension:  Restriction level: moderate  Left lateral flexion:  Restriction level: minimal  Right lateral flexion:  Restriction level minimal  Left rotation:  Restriction level: minimal  Right rotation:  Restriction level: minimal  Left Shoulder   Normal active range of motion    Right Shoulder   Flexion: 111 degrees     Right Wrist   Normal active range of motion    Passive Range of Motion   Left Shoulder   Normal passive range of motion    Right Shoulder   Flexion: 147 degrees   Abduction: 101 degrees   External rotation 45°: 65 degrees   Internal rotation 45°: 69 degrees     Strength/Myotome Testing     Left Shoulder   Normal muscle strength    Right Shoulder     Planes of Motion   Flexion: 3+   Extension: 3+   Abduction: 3+   Adduction: 3+   External rotation at 0°: 3+   Internal rotation at 0°: 3+              Precautions: RC repair DOS 24      DATE  Reassess   FOTO  JF  36   32 SC   MANUALS        Shoulder PROM per protocol  10 min JF 10' TS 12' 10 min SC 10 min   Elbow PROM                         Neuro Re-Ed        Scap retraction  30x NV 20x  20x 20x    shrugs 30x NV 20x 20x 20x   Bent over flexion and row     20x ea.                                    Ther Ex                Supine abd/ circles NV 20 x      Elbow AROM Flex/Ext 30x X30 supine Standing 3# 3# 30x 3# 30x   AROM  "Pro/Sup 30x 30x      Wand press/ flex 20 x  20x ea 20x 20x ea.  20x ea   Gripping  35# 30x 35# x40x      Nustep no arms L 5  5 min L 3 5 min      pulleys 5\" 10 x 5\" 10 x 5\" 10x  5\" 15x 10\" 10x   Flex bar red  15x      Table slide flexion /abd 5\" 15x 5\" 15 x Wall slide flexion 10x 10x    Standing Abduction with cane        Shoulder isometrics All   10x5\" 5\" 10x ea.  5\" 10x   Objective updates     10 min   Triceps kickback   10x5\"  20x   Ther Activity                        Gait Training                        Modalities        CP R Shld  10' 10' TS 10 min 10 min              Phase 1 (Weeks 0-6)              Immediate Postoperative Period              Goals:                          Diminish Pain and Inflammation  Maintain and Protect Integrity of the Repair                          Gradually Increase Passive ROM (NO Active or Active Assist until Week 6)                          Become Independent with Modified ADLs              Precautions:  Maintain Arm in Abduction Sling, Remove Only for Directed Exercises (may remove Abduction Pillow after Day 21 for comfort)                          Keep Incisions Clean & Dry (okay to shower in 48 hours, band-aids over incisions)  No Immersion (pool) until Wounds Totally Sealed (usually not prior to day #10)  Passive Shoulder Motion ONLY, No Lifting/Holding Objects, Reaching Behind Back  Okay to Type/Write at Desktop with Arm in Sling              Day 0-6                          Elbow, Wrist, Hand AROM Exercises                           Start Cervical AROM and Scapula Isometrics                          Cryotherapy/Ice for Pain and Inflammation                          Instruct in Hygiene, Posture, and Positioning               Day 7-28                          Continue Above  May Start Pendulum Exercises                          May Start Supine, Pain Free, PT assisted PROM  Forward Flexion to 90°, External Rotation to 35° (Elbow at side), Internal Rotation to Body, Abduction " to 60°                          Can Introduce light Cardio (Walking, Stationary Bike)                          Aqua Therapy may begin at week 3 (day 21) as long as no wound problems              Day 29-42                          Continue Above  Progress PROM to Goal of full PROM by Week 6.  May add Gentle Mobilizations (GH and Scapulothoracic) to Regain full PROM if Needed.  May add Heat prior to PROM Exercises, Ice after Exercises  May Begin AAROM at Day 29 if ROM is Appropriate in Anticipation of AROM Starting at Week 6              Criteria to Progress to Phase 2                          Reasonable Passive Forward Flexion, Abduction , IR/ER                          Time  Phase 2 (Weeks 6-12)              Protection and Active Motion              Goals of Phase:                          Allow Healing of Soft Tissues                          Decrease Pain and Inflammation  Add ADLs and Regain AROM by End of Phase              Precautions:                          NO STRENGTHENING until Phase 3                          Repair is Most Prone to Failure during this Phase!                          No Lifting Objects > 2 lbs (Coffee Cup OK), no Sudden Motions                          Avoid Upper Extremity Bike and Ergometer              Day 43-56                          Discontinue Sling  Initiate AROM Exercises (forward flexion, ER, IR and abduction), Rotator Cuff Isometrics                          Continue Periscapular Exercises, add Stretching if PROM Lacking   No Strengthening until Week 12 (Minimum Time Needed for Cuff Healing Sufficient to withstand Strengthening)                Phase 3 (Weeks 12-16)              Early Strengthening              Goal of Phase:                          Gain full AROM, Maintain PROM                          Gradual return of Shoulder Strength, Power and Endurance                          Gradual return to Functional Activities              Precautions:                          No  Lifting > 10lbs, Sudden Lifting or Pushing activities, Overhead Lifting                          No Upper Extremity Bike or Ergometer              Week 12                          Initiate Strengthening Program (10 lb Maximum until Phase 4)                            ER/IR with Bands (Standing)                            ER in Lateral Decubitius Position                            Lateral Raises                            Full Can in Scapular Plane                            Prone Rowing, Horizontal Abduction, Extension                            Elbow Flexion/Extension              Week 14-16                          Initiate light Functional Activities as Permitted  Progress to Fundamental Shoulder Exercises  Phase 4 (Variable but Weeks 16-24)              Aggressive Rehab  Sport Specific or Activity Specific               Goals of Phase:                          Maintain Full Pain-free AROM                          Advanced Conditioning Exercises for enhanced Functional use                          Continue regaining Shoulder Strength, Power and Endurance                          Eventual return to full Functional Activities              Precautions:                          None              Week 16                          Continue ROM and stretching if appropriate                          Progress Strengthening, Proprioceptive and Neuromuscular Training                          Light Sports if Progressing Well (Chipping/Putting, easy ground strokes etc.)              Week 20                          Continue Strengthening and Stretching                          Initiate Interval Sports Program as Appropriate

## 2024-11-26 ENCOUNTER — OFFICE VISIT (OUTPATIENT)
Dept: PHYSICAL THERAPY | Facility: CLINIC | Age: 54
End: 2024-11-26
Payer: OTHER MISCELLANEOUS

## 2024-11-26 DIAGNOSIS — Z98.890 STATUS POST ROTATOR CUFF REPAIR: Primary | ICD-10-CM

## 2024-11-26 DIAGNOSIS — M75.101 TEAR OF RIGHT SUPRASPINATUS TENDON: ICD-10-CM

## 2024-11-26 PROCEDURE — 97140 MANUAL THERAPY 1/> REGIONS: CPT

## 2024-11-26 PROCEDURE — 97110 THERAPEUTIC EXERCISES: CPT

## 2024-11-26 PROCEDURE — 97112 NEUROMUSCULAR REEDUCATION: CPT

## 2024-11-26 NOTE — PROGRESS NOTES
"Daily Note     Today's date: 2024  Patient name: Amari Jenkins  : 1970  MRN: [id number]  Referring provider: Belen Tatum P*  Dx:   Encounter Diagnosis     ICD-10-CM    1. Status post rotator cuff repair  Z98.890       2. Tear of right supraspinatus tendon  M75.101           Start Time: 0930  Stop Time: 1025  Total time in clinic (min): 55 minutes    Subjective:  I have some mild pain today over the front of my right shoulder.      Objective: See treatment diary below      Assessment: Tolerated treatment well. Patient would benefit from continued PT  pt completes his full program with little pain noted. He reports that his pain was over the front of his right front shoulder.  He had good passive motion to his right shoulder with little pain noted .  We will progress as the protocol allows.      Plan: Continue per plan of care.      Precautions: RC repair DOS 24      DATE  Reassess   FOTO  JF  36   32 SC   MANUALS        Shoulder PROM per protocol  10 min JF 10' TS 12' 10 min SC 10 min   Elbow PROM                         Neuro Re-Ed        Scap retraction  20 x NV 20x  20x 20x    shrugs 20x NV 20x 20x 20x   Bent over flexion and row 20 x    20x ea.                                    Ther Ex                Supine abd/ circles  20 x      Elbow AROM Flex/Ext 30x 3#  X30 supine Standing 3# 3# 30x 3# 30x   AROM Pro/Sup  30x      Wand press/ flex 30 x 20x ea 20x 20x ea.  20x ea   Gripping  35# 30x 35# x40x      Nustep no arms 5 min  L 3 5 min      pulleys 5\" 10 x 5\" 10 x 5\" 10x  5\" 15x 10\" 10x   Flex bar red  15x      Table slide flexion /abd 5\" 15x 5\" 15 x Wall slide flexion 10x 10x    Standing Abduction with cane        Shoulder isometrics All 5\" 10 x  10x5\" 5\" 10x ea.  5\" 10x   Objective updates     10 min   Triceps kickback 20 x  10x5\"  20x   Ther Activity                        Gait Training                        Modalities        CP R Shld  10' 10' TS 10 min " 10 min

## 2024-12-02 ENCOUNTER — OFFICE VISIT (OUTPATIENT)
Dept: PHYSICAL THERAPY | Facility: CLINIC | Age: 54
End: 2024-12-02
Payer: OTHER MISCELLANEOUS

## 2024-12-02 DIAGNOSIS — M75.101 TEAR OF RIGHT SUPRASPINATUS TENDON: Primary | ICD-10-CM

## 2024-12-02 DIAGNOSIS — Z98.890 STATUS POST ROTATOR CUFF REPAIR: ICD-10-CM

## 2024-12-02 PROCEDURE — 97110 THERAPEUTIC EXERCISES: CPT

## 2024-12-02 PROCEDURE — 97112 NEUROMUSCULAR REEDUCATION: CPT

## 2024-12-02 PROCEDURE — 97140 MANUAL THERAPY 1/> REGIONS: CPT

## 2024-12-02 NOTE — PROGRESS NOTES
"Daily Note     Today's date: 2024  Patient name: Amari Jenkins  : 1970  MRN: [id number]  Referring provider: Belen Tatum P*  Dx:   Encounter Diagnosis     ICD-10-CM    1. Tear of right supraspinatus tendon  M75.101       2. Status post rotator cuff repair  Z98.890           Start Time: 0930  Stop Time: 1015  Total time in clinic (min): 45 minutes    Subjective:  I have some mild soreness in my shoulder today.       Objective: See treatment diary below      Assessment: Tolerated treatment well. Patient would benefit from continued PT   pt reports having some mild soreness at times over his anterior right shoulder through out his session today.  Pt was able to completes the outlined program with no increase in pain,  pt had good passive motion in his right shoulder today.  We will increase his program as he is able as per the protocol.       Plan: Continue per plan of care.      Precautions: RC repair DOS 24      DATE  Reassess   FOTO         32 SC   MANUALS        Shoulder PROM per protocol  10 min JF 10' TS 12' 10 min SC 10 min   Elbow PROM                         Neuro Re-Ed        Scap retraction  20 x 20 x  20x  20x 20x    shrugs 20x 20 x 20x 20x 20x   Bent over flexion and row 20 x 20 x   20x ea.                                    Ther Ex                Supine abd/ circles  20 x      Elbow AROM Flex/Ext 30x 3#  X30 supine Standing 3# 3# 30x 3# 30x   AROM Pro/Sup  30x      Wand press/ flex 30 x 20x ea 20x 20x ea.  20x ea   Gripping  35# 30x 35# x40x      Nustep no arms 5 min  L 3 5 min      pulleys 5\" 10 x 5\" 10 x 5\" 10x  5\" 15x 10\" 10x   Flex bar red  15x      Table slide flexion /abd 5\" 15x 5\" 15 x Wall slide flexion 10x 10x    Standing Abduction with cane        Shoulder isometrics All 5\" 10 x 5 \" 10 x 10x5\" 5\" 10x ea.  5\" 10x   Objective updates     10 min   Triceps kickback 20 x 20 x 10x5\"  20x   Ther Activity                        Gait Training      "                   Modalities        CP R Shld  10' 10' TS 10 min 10 min

## 2024-12-04 ENCOUNTER — OFFICE VISIT (OUTPATIENT)
Dept: PHYSICAL THERAPY | Facility: CLINIC | Age: 54
End: 2024-12-04
Payer: OTHER MISCELLANEOUS

## 2024-12-04 DIAGNOSIS — Z98.890 STATUS POST ROTATOR CUFF REPAIR: Primary | ICD-10-CM

## 2024-12-04 DIAGNOSIS — M75.101 TEAR OF RIGHT SUPRASPINATUS TENDON: ICD-10-CM

## 2024-12-04 PROCEDURE — 97110 THERAPEUTIC EXERCISES: CPT

## 2024-12-04 PROCEDURE — 97140 MANUAL THERAPY 1/> REGIONS: CPT

## 2024-12-04 PROCEDURE — 97112 NEUROMUSCULAR REEDUCATION: CPT

## 2024-12-04 NOTE — PROGRESS NOTES
"Daily Note     Today's date: 2024  Patient name: Amari Jenkins  : 1970  MRN: [id number]  Referring provider: Belen Tatum P*  Dx:   Encounter Diagnosis     ICD-10-CM    1. Status post rotator cuff repair  Z98.890       2. Tear of right supraspinatus tendon  M75.101           Start Time: 0930  Stop Time: 1015  Total time in clinic (min): 45 minutes    Subjective:  My shoulder is feeling pretty good ,       Objective: See treatment diary below      Assessment: Tolerated treatment well. Patient would benefit from continued PT   pt did well with his outlined program today.  He reports some mild fatigue today through out his program today., he needs verbal cues at times to perform the exercises correctly.   We continue to follow the protocol as per the Dr.  Pt is doing well over all .      Plan: Continue per plan of care.      Precautions: RC repair DOS 24      DATE  Reassess   FOTO      JF  32   32 SC   MANUALS        Shoulder PROM per protocol  10 min JF 10' JF 10 min SC 10 min   Elbow PROM                         Neuro Re-Ed        Scap retraction  20 x 20 x  20x  20x 20x    shrugs 20x 20 x 20x 20x 20x   Bent over flexion and row 20 x 20 x 20 x  20x ea.                                    Ther Ex                Supine abd/ circles  20 x 20 x     Elbow AROM Flex/Ext 30x 3#  X30 supine Standing 3# 3# 30x 3# 30x   AROM Pro/Sup  30x 30 x     Wand press/ flex 30 x 20x ea 20x 20x ea.  20x ea   Gripping  35# 30x 35# x40x 35# 40 x     Nustep no arms 5 min  L 3 5 min L 3 5 min      pulleys 5\" 10 x 5\" 10 x 5\" 10x  5\" 15x 10\" 10x   Flex bar red  15x 15 x      Table slide flexion /abd 5\" 15x 5\" 15 x Wall slide flexion 10x 10x    Standing Abduction with cane        Shoulder isometrics All 5\" 10 x 5 \" 10 x 10x5\" 5\" 10x ea.  5\" 10x   Objective updates     10 min   Triceps kickback 20 x 30 x 10x5\"  20x   Ther Activity                        Gait Training                      "   Modalities        CP R Shld  10' 10' TS 10 min 10 min

## 2024-12-06 ENCOUNTER — OFFICE VISIT (OUTPATIENT)
Dept: PHYSICAL THERAPY | Facility: CLINIC | Age: 54
End: 2024-12-06
Payer: OTHER MISCELLANEOUS

## 2024-12-06 DIAGNOSIS — Z98.890 STATUS POST ROTATOR CUFF REPAIR: ICD-10-CM

## 2024-12-06 DIAGNOSIS — M75.101 TEAR OF RIGHT SUPRASPINATUS TENDON: Primary | ICD-10-CM

## 2024-12-06 PROCEDURE — 97110 THERAPEUTIC EXERCISES: CPT

## 2024-12-06 PROCEDURE — 97112 NEUROMUSCULAR REEDUCATION: CPT

## 2024-12-06 PROCEDURE — 97140 MANUAL THERAPY 1/> REGIONS: CPT

## 2024-12-06 NOTE — PROGRESS NOTES
"Daily Note     Today's date: 2024  Patient name: Amari Jenkins  : 1970  MRN: [id number]  Referring provider: Belen Tatum P*  Dx:   Encounter Diagnosis     ICD-10-CM    1. Tear of right supraspinatus tendon  M75.101       2. Status post rotator cuff repair  Z98.890           Start Time: 09  Stop Time: 1015  Total time in clinic (min): 45 minutes    Subjective :  My shoulder is sore today. It is also stiff today. I think its the weather,       Objective: See treatment diary below      Assessment: Tolerated treatment well. Patient would benefit from continued PT  pt completed his program today with some mild soreness noted at times.  He had good passive motion and is doing well with his AAROM exercises.  Pt will be seeing the Dr next Monday . The pt feels he is doing well . He had  some mild fatigue and soreness post.        Plan: Continue per plan of care.      Precautions: RC repair DOS 24      DATE  Reassess   FOTO      JF  32   32 SC   MANUALS        Shoulder PROM per protocol  10 min JF 10' JF 10 min  10 min   Elbow PROM                         Neuro Re-Ed        Scap retraction  20 x 20 x  20x  20x 20x    shrugs 20x 20 x 20x 20x 20x   Bent over flexion and row 20 x 20 x 20 x 20 x 20x ea.                                    Ther Ex                Supine abd/ circles  20 x 20 x 20 x    Elbow AROM Flex/Ext 30x 3#  X30 supine Standing 3# 3# 30x 3# 30x   AROM Pro/Sup  30x 30 x     Wand press/ flex 30 x 20x ea 20x 20x ea.  20x ea   Gripping  35# 30x 35# x40x 35# 40 x 35#  40 x    Nustep no arms 5 min  L 3 5 min L 3 5 min  L 3 5 min    pulleys 5\" 10 x 5\" 10 x 5\" 10x  5\" 15x 10\" 10x   Flex bar red  15x 15 x  20 x    Table slide flexion /abd 5\" 15x 5\" 15 x Wall slide flexion 10x 10x    Standing Abduction with cane        Shoulder isometrics All 5\" 10 x 5 \" 10 x 10x5\" 5\" 10x ea.  5\" 10x   Objective updates     10 min   Triceps kickback 20 x 30 x 10x5\" 20 x 20x   Ther " Activity                        Gait Training                        Modalities        CP R Shshyann  10' 10' TS 10 min 10 min

## 2024-12-09 ENCOUNTER — OFFICE VISIT (OUTPATIENT)
Dept: OBGYN CLINIC | Facility: OTHER | Age: 54
End: 2024-12-09

## 2024-12-09 VITALS
DIASTOLIC BLOOD PRESSURE: 85 MMHG | HEIGHT: 70 IN | SYSTOLIC BLOOD PRESSURE: 122 MMHG | WEIGHT: 197 LBS | BODY MASS INDEX: 28.2 KG/M2 | HEART RATE: 91 BPM

## 2024-12-09 DIAGNOSIS — Z98.890 S/P RIGHT ROTATOR CUFF REPAIR: Primary | ICD-10-CM

## 2024-12-09 PROCEDURE — 99024 POSTOP FOLLOW-UP VISIT: CPT | Performed by: PHYSICIAN ASSISTANT

## 2024-12-09 NOTE — PROGRESS NOTES
"Surgery: SARS w/revision RCR on 9/11/2024    S: Patient is doing well.  They have been compliant with formal PT and the HEP.  Denies new injury or trauma.  Will wake up if sleeping on the operative side.  Happy with progress    /85   Pulse 91   Ht 5' 10\" (1.778 m)   Wt 89.4 kg (197 lb)   BMI 28.27 kg/m²     O: RIGHT shoulder  FF: Full  Abd: Full   ER: Full  IR: Full  ER strength: Good Resistance  Abd strength: Good Resistance  Good elbow, wrist and hand range of motion  Skin - warm and dry  SI  NVI    A/P: 13 weeks following arthroscopic RIGHT shoulder rotator cuff repair  Continue with formal physical therapy - following standard RCR protocol.  He will start Rotator Cuff strengthening this week  HEP - per therapy.    Pain control - Tylenol 1000 mg every 8 hours  Ice as needed - 20 minutes on and 20 minutes off  Follow up in 6-8 weeks   Work restrictions - per Dr Colin  "

## 2024-12-09 NOTE — LETTER
"December 9, 2024     Amari Colin MD  [address] [place] [id number]    Patient: Amari Jenkins   YOB: 1970   Date of Visit: 12/9/2024       Dear Dr. Colin:    Thank you for referring Amari Jenkins to me for evaluation. Below are my notes for this consultation.    If you have questions, please do not hesitate to call me. I look forward to following your patient along with you.         Sincerely,        Belen Tatum PA-C        CC: No Recipients    Belen Tatum PA-C  12/9/2024  2:25 PM  Sign when Signing Visit  Surgery: SARS w/revision RCR on 9/11/2024    S: Patient is doing well.  They have been compliant with formal PT and the HEP.  Denies new injury or trauma    /85   Pulse 91   Ht 5' 10\" (1.778 m)   Wt 89.4 kg (197 lb)   BMI 28.27 kg/m²     O: RIGHT shoulder  FF:   Abd:   ER:   IR:   ER strength:   Abd strength:   Good elbow, wrist and hand range of motion  Skin - warm and dry  SI  NVI    A/P: 13 weeks following arthroscopic RIGHT shoulder rotator cuff repair  Continue with formal physical therapy - following standard RCR protocol  HEP - per therapy.    Pain control - Tylenol 1000 mg every 8 hours  Ice as needed - 20 minutes on and 20 minutes off  Follow up in 6-8 weeks   "

## 2024-12-10 ENCOUNTER — OFFICE VISIT (OUTPATIENT)
Dept: PHYSICAL THERAPY | Facility: CLINIC | Age: 54
End: 2024-12-10
Payer: OTHER MISCELLANEOUS

## 2024-12-10 DIAGNOSIS — M75.101 TEAR OF RIGHT SUPRASPINATUS TENDON: ICD-10-CM

## 2024-12-10 DIAGNOSIS — Z98.890 STATUS POST ROTATOR CUFF REPAIR: Primary | ICD-10-CM

## 2024-12-10 PROCEDURE — 97140 MANUAL THERAPY 1/> REGIONS: CPT

## 2024-12-10 PROCEDURE — 97112 NEUROMUSCULAR REEDUCATION: CPT

## 2024-12-10 PROCEDURE — 97110 THERAPEUTIC EXERCISES: CPT

## 2024-12-10 NOTE — PROGRESS NOTES
"Daily Note     Today's date: 12/10/2024  Patient name: Amari Jenkins  : 1970  MRN: [id number]  Referring provider: Belen Tatum P*  Dx:   Encounter Diagnosis     ICD-10-CM    1. Status post rotator cuff repair  Z98.890       2. Tear of right supraspinatus tendon  M75.101           Start Time: 0930  Stop Time: 1025  Total time in clinic (min): 55 minutes    Subjective:   I am more sore today which I think is from the rainy weather. I did also lay on that side last night.       Objective: See treatment diary below      Assessment: Tolerated treatment well. Patient would benefit from continued PT   pt states that he saw the Dr and was pleased with his progress.  We were able to increase reps for some exercises and increased wt for bicep curls.  He reports having some mild soreness through out his program today,. He had good passive motion today with only some mild soreness.  We will increase his program as per the Dr protocol.  He declines ice post.       Plan: Continue per plan of care.      Precautions: RC repair DOS 24      DATE 11/26 12/2 12/4 12/6 12/10   FOTO      JF  32      MANUALS        Shoulder PROM per protocol  10 min JF 10' JF 10 min  10 min   Elbow PROM                         Neuro Re-Ed        Scap retraction  20 x 20 x  20x  20x 20x    shrugs 20x 20 x 20x 20x 20x   Bent over flexion and row 20 x 20 x 20 x 20 x 30x ea.                                    Ther Ex                Supine abd/ circles  20 x 20 x 20 x 30x   Elbow AROM Flex/Ext 30x 3#  X30 supine Standing 3# 3# 30x 4# 30x   AROM Pro/Sup  30x 30 x     Wand press/ flex 30 x 20x ea 20x 20x ea.  20x ea   2#     Gripping  35# 30x 35# x40x 35# 40 x 35#  40 x 35#  40 x   Nustep no arms 5 min  L 3 5 min L 3 5 min  L 3 5 min L 4 5 min   pulleys 5\" 10 x 5\" 10 x 5\" 10x  5\" 15x 10\" 10x   Flex bar red  15x 15 x  20 x 20x   Table slide flexion /abd 5\" 15x 5\" 15 x Wall slide flexion 10x 10x    Standing Abduction with cane      " "  Shoulder isometrics All 5\" 10 x 5 \" 10 x 10x5\" 5\" 10x ea.  5\" 10x   Objective updates     10 min   Triceps kickback 20 x 30 x 10x5\" 20 x 30x   Ther Activity                        Gait Training                        Modalities        CP R Shld  10' 10' TS 10 min 10 min                        "

## 2024-12-12 ENCOUNTER — OFFICE VISIT (OUTPATIENT)
Dept: PHYSICAL THERAPY | Facility: CLINIC | Age: 54
End: 2024-12-12
Payer: OTHER MISCELLANEOUS

## 2024-12-12 DIAGNOSIS — Z98.890 STATUS POST ROTATOR CUFF REPAIR: Primary | ICD-10-CM

## 2024-12-12 DIAGNOSIS — M75.101 TEAR OF RIGHT SUPRASPINATUS TENDON: ICD-10-CM

## 2024-12-12 PROCEDURE — 97110 THERAPEUTIC EXERCISES: CPT | Performed by: PHYSICAL THERAPIST

## 2024-12-12 PROCEDURE — 97112 NEUROMUSCULAR REEDUCATION: CPT | Performed by: PHYSICAL THERAPIST

## 2024-12-12 PROCEDURE — 97140 MANUAL THERAPY 1/> REGIONS: CPT | Performed by: PHYSICAL THERAPIST

## 2024-12-12 NOTE — PROGRESS NOTES
Daily Note     Today's date: 2024  Patient name: Amari Jenkins  : 1970  MRN: [id number]  Referring provider: Belen Tatum P*  Dx:   Encounter Diagnosis     ICD-10-CM    1. Status post rotator cuff repair  Z98.890       2. Tear of right supraspinatus tendon  M75.101           Start Time: 0800  Stop Time: 0850  Total time in clinic (min): 50 minutes    Subjective: Patient reports that his shoulder is doing well. He was sore after his last session but feels that it was nothing that he would not expect. He saw the doctor on Monday who is pleased with his progress thus far and would like to see him start to initiate light strengthening this week. He finds that there is some discomfort getting a shirt or sweatshirt off with a pulling upward motion.       Objective: See treatment diary below      Assessment: Tolerated treatment well. Patient demonstrated fatigue post treatment, exhibited good technique with therapeutic exercises, and would benefit from continued PT Patient continues to respond well to therapy interventions. We progressed light strengthening per protocol guidelines today without issue. He continues to demonstrate good mobility of the R shoulder with mild crepitus noted during passive shoulder flexion toward end range. He offers no reports of pain during passive stretching.       Plan: Continue per plan of care.  Progress treatment as tolerated.       Precautions: RC repair DOS 24      DATE 12/12 12/2 12/4 12/6 12/10   FOTO   32 JF     MANUALS        Shoulder PROM per protocol  10 min SC JF 10' JF 10 min  10 min                   Neuro Re-Ed        Scap retraction   20 x  20x  20x 20x    shrugs  20 x 20x 20x 20x   Bent over flexion and row 20x ea.  20 x 20 x 20 x 30x ea.                                    Ther Ex        CC OLGA, row, Triceps extension P1 2x10       Supine abd/ circles 2# 20x ea.  20 x 20 x 20 x 30x   Elbow AROM Flex/Ext  X30 supine Standing 3# 3# 30x 4# 30x   Wand  "press/ flex 3# 30x ea.  20x ea 20x 20x ea.  20x ea   2#     Nustep no arms  L 3 5 min L 3 5 min  L 3 5 min L 4 5 min   Table slide flexion /abd  5\" 15 x Wall slide flexion 10x 10x    Standing Abduction with cane        Shoulder isometrics All 5\" 5x 5 \" 10 x 10x5\" 5\" 10x ea.  5\" 10x   Objective updates     10 min   Triceps kickback  30 x 10x5\" 20 x 30x   Ther Activity                        Gait Training                        Modalities        CP R Shld NT 10' 10' TS 10 min 10 min                          "

## 2024-12-17 ENCOUNTER — EVALUATION (OUTPATIENT)
Dept: PHYSICAL THERAPY | Facility: CLINIC | Age: 54
End: 2024-12-17
Payer: OTHER MISCELLANEOUS

## 2024-12-17 DIAGNOSIS — M75.101 TEAR OF RIGHT SUPRASPINATUS TENDON: ICD-10-CM

## 2024-12-17 DIAGNOSIS — Z98.890 STATUS POST ROTATOR CUFF REPAIR: Primary | ICD-10-CM

## 2024-12-17 PROCEDURE — 97140 MANUAL THERAPY 1/> REGIONS: CPT

## 2024-12-17 PROCEDURE — 97112 NEUROMUSCULAR REEDUCATION: CPT

## 2024-12-17 PROCEDURE — 97110 THERAPEUTIC EXERCISES: CPT

## 2024-12-17 NOTE — PROGRESS NOTES
"Daily Note     Today's date: 2024  Patient name: Amari Jenkins  : 1970  MRN: [id number]  Referring provider: Belen Tatum P*  Dx:   Encounter Diagnosis     ICD-10-CM    1. Status post rotator cuff repair  Z98.890       2. Tear of right supraspinatus tendon  M75.101           Start Time: 0930  Stop Time: 1015  Total time in clinic (min): 45 minutes    Subjective: My shoulder is feeling pretty good today., I have some trouble with shaving.  I get some elbow pain with folding clothes,. I still have trouble reaching out in front of me,.       Objective: See treatment diary below      Assessment: Tolerated treatment well. Patient would benefit from continued PT   pt completes his full program today with good tolerance,  He had some mild shoulder fatigue . His active motion was good as well as passive.  We continue to follow the protocol as progression his program,  pt declines ice post.        Plan: Continue per plan of care.      Precautions: RC repair DOS 24      DATE 12/12 12/17 12/4 12/6 12/10   FOTO   32 JF     MANUALS        Shoulder PROM per protocol  10 min SC JF 10' JF 10 min  10 min                   Neuro Re-Ed        Scap retraction   20 x  20x  20x 20x    shrugs  20 x 20x 20x 20x   Bent over flexion and row 20x ea.  30 x 20 x 20 x 30x ea.                                    Ther Ex        CC OLGA, row, Triceps extension P1 2x10 P 1  20 x  row / OLGA  Tri P 2   20 x      Supine abd/ circles 2# 20x ea.  20 x 2#  20 x 20 x 30x   Elbow AROM Flex/Ext  4#   30 x Standing 3# 3# 30x 4# 30x   Wand press/ flex 3# 30x ea.  30 X 3# 20x 20x ea.  20x ea   2#     Nustep no arms  L 3 5 min L 3 5 min  L 3 5 min L 4 5 min   Table slide flexion /abd   Wall slide flexion 10x 10x    Standing Abduction with cane  20 x      Shoulder isometrics All 5\" 5x 5 \" 10 x 10x5\" 5\" 10x ea.  5\" 10x   Objective updates     10 min      10x5\" 20 x 30x   Ther Activity        cones  5 x              Gait Training          "               Modalities        CP R Shld NT 10' 10' TS 10 min 10 min

## 2024-12-17 NOTE — LETTER
2024    Belen Tatum PA-C  [address] [facility] [address]    Patient: Amari Jenkins   YOB: 1970   Date of Visit: 2024     Encounter Diagnosis     ICD-10-CM    1. Status post rotator cuff repair  Z98.890       2. Tear of right supraspinatus tendon  M75.101           Dear Dr. Tatum:    Thank you for your recent referral of Amari Jenkins. Please review the attached evaluation summary from Amari's recent visit.     Please verify that you agree with the plan of care by signing the attached order.     If you have any questions or concerns, please do not hesitate to call.     I sincerely appreciate the opportunity to share in the care of one of your patients and hope to have another opportunity to work with you in the near future.       Sincerely,    Bolivar Ramirez, PT      Referring Provider:      I certify that I have read the below Plan of Care and certify the need for these services furnished under this plan of treatment while under my care.                    Belen Tatum PA-C  [address] [facility] [address]  Via In Basket          PT Re-Evaluation     Today's date: 2024  Patient name: Amari Jenkins  : 1970  MRN: [id number]  Referring provider: Belen Tatum P*  Dx:   Encounter Diagnosis     ICD-10-CM    1. Status post rotator cuff repair  Z98.890       2. Tear of right supraspinatus tendon  M75.101           Start Time: 0930  Stop Time: 1015  Total time in clinic (min): 45 minutes    Assessment  Impairments: abnormal or restricted ROM, activity intolerance, impaired physical strength, lacks appropriate home exercise program and pain with function  Symptom irritability: low    Assessment details: Patient has attended 27 physical therapy visits to date. He continues to show improvements with both mobility and strength of the R shoulder. He has mild discomfort noted at end range of flexion and ER. There is weakness noted to the R  shoulder which is to be expected at this time secondary to protocol guidelines. Therapy interventions continue to focus on progression of strength and mobility to tolerance and per protocol guidelines.   Understanding of Dx/Px/POC: good     Prognosis: good    Goals  ST. Independent with HEP in 2 weeks - MET  2. Pt will have verbal report of improvement in symptoms by >/=25% in 2 weeks - MET    To be achieved by D/C   LT. Pt will improve FOTO score by >/= 5 points in 6 weeks - Not MET  2. Pt will improve FOTO score to >/= 61 by visit # 24 - Not MET  3. Pt will be able to return to work - Not MET  4. Pt will be able to perform ADLs independently - Progressing  5. Pt will be able to perform household chores independently - Not MET  6. Pt will be able to return to recreational activities without restriction - Not MET  7. Pt will be able to drive with no difficulty - MET      Plan  Patient would benefit from: skilled physical therapy    Planned therapy interventions: activity modification, manual therapy, motor coordination training, neuromuscular re-education, patient education, self care, therapeutic activities, therapeutic exercise, graded activity, home exercise program, graded exercise, functional ROM exercises and strengthening    Frequency: 2x week  Duration in weeks: 5  Plan of Care beginning date: 2024  Plan of Care expiration date: 2025  Treatment plan discussed with: patient  Plan details: Patient will continue to benefit from skilled physical therapy to address the functional deficits that were identified during the evaluation today. We will continue to progress the therapy program to address these functional deficits and achieve the established goals.   Patient informed that from this point forward, to ensure adherence to the aforementioned plan of care, all or some of the treatment may be performed and carried out by a Physical Therapy Assistant (PTA) with supervision from a licensed  Physical Therapist (PT) in accordance with [facility] Physical Therapy Practice Act.            Subjective Evaluation    History of Present Illness  Mechanism of injury: Pt under went RC repair on 24 for the second time. His original surgery was May 2023. He had no improvement from the surgery and when he was evaluated by a second opinion the tear was still present.     Since the surgery he is continuing to have pain. He notes compl    Pt is a : he would need to be able to lift 75lbs     Update 10/17/2024:  Patient reports pain levels are well controlled. He continues to report compliance to his sling and HEP. He feels that the shoulder is a little stiff but otherwise he is doing quite well. He denies any cardinal signs of infection.     Update 2024:  Patient reports that his arm is improving. He notes that he still has some soreness with activity but has been refraining from doing any quick movements and heavier lifting. He is still apprehensive to lay on the R shoulder as this will cause irritation to the shoulder.     Update 2024:  Patient reports that his shoulder is doing better and that he continues to see improvements with his daily activities. He notes that when he is laying on his side. His arm will get tired when he is using it for extended periods of time.   Patient Goals  Patient goals for therapy: decreased pain and return to work  Patient goal: be able to play frisbee with his dogs, be able to ride his ATV,  be able to go camping,  Pain  Current pain ratin  At best pain ratin  At worst pain rating: 3  Location: R shoulder  Quality: discomfort and dull ache  Relieving factors: ice, medications, change in position, support and rest    Hand dominance: right          Objective     Active Range of Motion   Cervical/Thoracic Spine       Cervical    Flexion:  WFL  Extension:  Restriction level: moderate  Left lateral flexion:  Restriction level: minimal  Right  lateral flexion:  Restriction level minimal  Left rotation:  Restriction level: minimal  Right rotation:  Restriction level: minimal  Left Shoulder   Normal active range of motion    Right Shoulder   Flexion: 139 degrees     Right Wrist   Normal active range of motion    Passive Range of Motion   Left Shoulder   Normal passive range of motion    Right Shoulder   Flexion: 157 degrees   External rotation 90°: 78 degrees   Internal rotation 90°: 60 degrees     Strength/Myotome Testing     Left Shoulder   Normal muscle strength    Right Shoulder     Planes of Motion   Flexion: 4-   Extension: 4-   Abduction: 3+   Adduction: 4-   External rotation at 0°: 3+   Internal rotation at 0°: 4-              Precautions: RC repair DOS 9/11/24         Phase 1 (Weeks 0-6)              Immediate Postoperative Period              Goals:                          Diminish Pain and Inflammation  Maintain and Protect Integrity of the Repair                          Gradually Increase Passive ROM (NO Active or Active Assist until Week 6)                          Become Independent with Modified ADLs              Precautions:  Maintain Arm in Abduction Sling, Remove Only for Directed Exercises (may remove Abduction Pillow after Day 21 for comfort)                          Keep Incisions Clean & Dry (okay to shower in 48 hours, band-aids over incisions)  No Immersion (pool) until Wounds Totally Sealed (usually not prior to day #10)  Passive Shoulder Motion ONLY, No Lifting/Holding Objects, Reaching Behind Back  Okay to Type/Write at Desktop with Arm in Sling              Day 0-6                          Elbow, Wrist, Hand AROM Exercises                           Start Cervical AROM and Scapula Isometrics                          Cryotherapy/Ice for Pain and Inflammation                          Instruct in Hygiene, Posture, and Positioning               Day 7-28                          Continue Above  May Start Pendulum Exercises                           May Start Supine, Pain Free, PT assisted PROM  Forward Flexion to 90°, External Rotation to 35° (Elbow at side), Internal Rotation to Body, Abduction to 60°                          Can Introduce light Cardio (Walking, Stationary Bike)                          Aqua Therapy may begin at week 3 (day 21) as long as no wound problems              Day 29-42                          Continue Above  Progress PROM to Goal of full PROM by Week 6.  May add Gentle Mobilizations (GH and Scapulothoracic) to Regain full PROM if Needed.  May add Heat prior to PROM Exercises, Ice after Exercises  May Begin AAROM at Day 29 if ROM is Appropriate in Anticipation of AROM Starting at Week 6              Criteria to Progress to Phase 2                          Reasonable Passive Forward Flexion, Abduction , IR/ER                          Time  Phase 2 (Weeks 6-12)              Protection and Active Motion              Goals of Phase:                          Allow Healing of Soft Tissues                          Decrease Pain and Inflammation  Add ADLs and Regain AROM by End of Phase              Precautions:                          NO STRENGTHENING until Phase 3                          Repair is Most Prone to Failure during this Phase!                          No Lifting Objects > 2 lbs (Coffee Cup OK), no Sudden Motions                          Avoid Upper Extremity Bike and Ergometer              Day 43-56                          Discontinue Sling  Initiate AROM Exercises (forward flexion, ER, IR and abduction), Rotator Cuff Isometrics                          Continue Periscapular Exercises, add Stretching if PROM Lacking   No Strengthening until Week 12 (Minimum Time Needed for Cuff Healing Sufficient to withstand Strengthening)                Phase 3 (Weeks 12-16)              Early Strengthening              Goal of Phase:                          Gain full AROM, Maintain PROM                          Gradual  return of Shoulder Strength, Power and Endurance                          Gradual return to Functional Activities              Precautions:                          No Lifting > 10lbs, Sudden Lifting or Pushing activities, Overhead Lifting                          No Upper Extremity Bike or Ergometer              Week 12                          Initiate Strengthening Program (10 lb Maximum until Phase 4)                            ER/IR with Bands (Standing)                            ER in Lateral Decubitius Position                            Lateral Raises                            Full Can in Scapular Plane                            Prone Rowing, Horizontal Abduction, Extension                            Elbow Flexion/Extension              Week 14-16                          Initiate light Functional Activities as Permitted  Progress to Fundamental Shoulder Exercises  Phase 4 (Variable but Weeks 16-24)              Aggressive Rehab  Sport Specific or Activity Specific               Goals of Phase:                          Maintain Full Pain-free AROM                          Advanced Conditioning Exercises for enhanced Functional use                          Continue regaining Shoulder Strength, Power and Endurance                          Eventual return to full Functional Activities              Precautions:                          None              Week 16                          Continue ROM and stretching if appropriate                          Progress Strengthening, Proprioceptive and Neuromuscular Training                          Light Sports if Progressing Well (Chipping/Putting, easy ground strokes etc.)              Week 20                          Continue Strengthening and Stretching                          Initiate Interval Sports Program as Appropriate       Daily Note     Today's date: 2024  Patient name: Amari AGUILAR Gregory  : 1970  MRN: [id number]  Referring provider: Rc  "Belen Lynn, P*  Dx:   Encounter Diagnosis     ICD-10-CM    1. Status post rotator cuff repair  Z98.890       2. Tear of right supraspinatus tendon  M75.101           Start Time: 0930  Stop Time: 1015  Total time in clinic (min): 45 minutes    Subjective: My shoulder is feeling pretty good today., I have some trouble with shaving.  I get some elbow pain with folding clothes,. I still have trouble reaching out in front of me,.       Objective: See treatment diary below      Assessment: Tolerated treatment well. Patient would benefit from continued PT   pt completes his full program today with good tolerance,  He had some mild shoulder fatigue . His active motion was good as well as passive.  We continue to follow the protocol as progression his program,  pt declines ice post.        Plan: Continue per plan of care.      Precautions: RC repair DOS 9/11/24      DATE 12/12 12/17 12/4 12/6 12/10   FOTO   32 JF     MANUALS        Shoulder PROM per protocol  10 min SC JF 10' JF 10 min  10 min                   Neuro Re-Ed        Scap retraction   20 x  20x  20x 20x    shrugs  20 x 20x 20x 20x   Bent over flexion and row 20x ea.  30 x 20 x 20 x 30x ea.                                    Ther Ex        CC OLGA, row, Triceps extension P1 2x10 P 1  20 x  row / OLGA  Tri P 2   20 x      Supine abd/ circles 2# 20x ea.  20 x 2#  20 x 20 x 30x   Elbow AROM Flex/Ext  4#   30 x Standing 3# 3# 30x 4# 30x   Wand press/ flex 3# 30x ea.  30 X 3# 20x 20x ea.  20x ea   2#     Nustep no arms  L 3 5 min L 3 5 min  L 3 5 min L 4 5 min   Table slide flexion /abd   Wall slide flexion 10x 10x    Standing Abduction with cane  20 x      Shoulder isometrics All 5\" 5x 5 \" 10 x 10x5\" 5\" 10x ea.  5\" 10x   Objective updates     10 min      10x5\" 20 x 30x   Ther Activity        cones  5 x              Gait Training                        Modalities        CP R Shld NT 10' 10' TS 10 min 10 min                                            "

## 2024-12-19 ENCOUNTER — OFFICE VISIT (OUTPATIENT)
Dept: PHYSICAL THERAPY | Facility: CLINIC | Age: 54
End: 2024-12-19
Payer: OTHER MISCELLANEOUS

## 2024-12-19 DIAGNOSIS — Z98.890 STATUS POST ROTATOR CUFF REPAIR: ICD-10-CM

## 2024-12-19 DIAGNOSIS — M75.101 TEAR OF RIGHT SUPRASPINATUS TENDON: Primary | ICD-10-CM

## 2024-12-19 PROCEDURE — 97112 NEUROMUSCULAR REEDUCATION: CPT

## 2024-12-19 PROCEDURE — 97110 THERAPEUTIC EXERCISES: CPT

## 2024-12-19 PROCEDURE — 97140 MANUAL THERAPY 1/> REGIONS: CPT

## 2024-12-19 NOTE — PROGRESS NOTES
"Daily Note     Today's date: 2024  Patient name: Amari Jenkins  : 1970  MRN: [id number]  Referring provider: Belen Tatum P*  Dx:   Encounter Diagnosis     ICD-10-CM    1. Tear of right supraspinatus tendon  M75.101       2. Status post rotator cuff repair  Z98.890           Start Time: 930  Stop Time: 1015  Total time in clinic (min): 45 minutes    Subjective :  I have some tightness in my shoulder today. I have some mild soreness.       Objective: See treatment diary below      Assessment: Tolerated treatment well. Patient would benefit from continued PT  we began want flex in standing today and cones lifting to shoulder height,. He completed his full program today with little discomfort. He did report feeling fatigue in his shoulder through out his session.  He had good passive shoulder motion today. We will continue to follow the protocol as per Dr Jesus      Plan: Continue per plan of care.      Precautions: RC repair DOS 24      DATE 12/12 12/17 12/19 12/6 12/10   FOTO        MANUALS        Shoulder PROM per protocol  10 min SC JF 10' JF 10 min  10 min                   Neuro Re-Ed        Scap retraction   20 x  20x  20x 20x    shrugs  20 x 20x 20x 20x   Bent over flexion and row 20x ea.  30 x 20 x 20 x 30x ea.                                    Ther Ex        CC OLGA, row, Triceps extension P1 2x10 P 1  20 x  row / OLGA  Tri P 2   20 x P 2   20 x  OLGA  Tri  P 2  20 x     Supine abd/ circles 2# 20x ea.  20 x 2#  20 x 20 x 30x   Elbow AROM Flex/Ext  4#   30 x Standing 3# 3# 30x 4# 30x   Wand press/ flex 3# 30x ea.  30 X 3# 20x 20x ea.  20x ea   2#     Nustep no arms  L 3 5 min L 3 5 min  L 3 5 min L 4 5 min   Table slide flexion /abd   Wall slide flexion 10x 10x    Standing Abduction with cane  20 x      Shoulder isometrics All 5\" 5x 5 \" 10 x 10x5\" 5\" 10x ea.  5\" 10x   Objective updates     10 min      10x5\" 20 x 30x   Ther Activity        cones   3 x              Gait Training           "              Modalities        CP R Shld NT 10' 10' TS 10 min 10 min

## 2024-12-24 ENCOUNTER — OFFICE VISIT (OUTPATIENT)
Dept: PHYSICAL THERAPY | Facility: CLINIC | Age: 54
End: 2024-12-24
Payer: OTHER MISCELLANEOUS

## 2024-12-24 DIAGNOSIS — M75.101 TEAR OF RIGHT SUPRASPINATUS TENDON: ICD-10-CM

## 2024-12-24 DIAGNOSIS — Z98.890 STATUS POST ROTATOR CUFF REPAIR: Primary | ICD-10-CM

## 2024-12-24 PROCEDURE — 97140 MANUAL THERAPY 1/> REGIONS: CPT

## 2024-12-24 PROCEDURE — 97110 THERAPEUTIC EXERCISES: CPT

## 2024-12-24 PROCEDURE — 97112 NEUROMUSCULAR REEDUCATION: CPT

## 2024-12-24 NOTE — PROGRESS NOTES
"Daily Note     Today's date: 2024  Patient name: Amari Jenkins  : 1970  MRN: [id number]  Referring provider: Belen Tatum P*  Dx:   Encounter Diagnosis     ICD-10-CM    1. Status post rotator cuff repair  Z98.890       2. Tear of right supraspinatus tendon  M75.101           Start Time: 0930  Stop Time: 1015  Total time in clinic (min): 45 minutes    Subjective:  My shoulder is doing ok but, a bit stiff.      Objective: See treatment diary below      Assessment: Tolerated treatment well. Patient would benefit from continued PT   pt reports having some mild soreness today , but, he did well with his outlined exercise program  He is doing better with his standing exercises and feels that his shoulder is feeling more stable and stronger,.  He reports that the exercises are feeling easier to do. Pt had good passive motion today with no pain noted from the pt.       Plan: Continue per plan of care.      Precautions: RC repair DOS 24      DATE 12/12 12/17 12/19 12/24 12/10   FOTO        MANUALS        Shoulder PROM per protocol  10 min SC JF 10' JF 10 min  10 min                   Neuro Re-Ed        Scap retraction   20 x  20x  20x 20x    shrugs  20 x 20x  20x   Bent over flexion and row 20x ea.  30 x 20 x 20 x 3#  30x ea.                                    Ther Ex        CC BELINDA, row, Triceps extension P1 2x10 P 1  20 x  row / BELINDA  Tri P 2   20 x P 2   20 x  BELINDA  Tri  P 2  20 x P 2  20 x  belinda  Tri  P 2   20 x    Supine abd/ circles 2# 20x ea.  20 x 2#  20 x 20 x 30x   Elbow AROM Flex/Ext  4#   30 x Standing 3# 4# 30x 4# 30x   Wand press/ flex 3# 30x ea.  30 X 3# 20x 20x ea.  20x ea   2#     Nustep no arms  L 3 5 min L 3 5 min  L 3 5 min L 4 5 min   Table slide flexion /abd   Wall slide flexion 10x 10x    Standing Abduction with cane  20 x      Shoulder isometrics All 5\" 5x 5 \" 10 x 10x5\" 5\" 10x ea.  5\" 10x   Objective updates     10 min   No monies   10x5\" 3\" 15  30x   Ther Activity      "   cones   3 x  3 x             Gait Training                        Modalities        CP R Shld NT 10' 10' TS 10 min 10 min

## 2024-12-26 ENCOUNTER — OFFICE VISIT (OUTPATIENT)
Dept: PHYSICAL THERAPY | Facility: CLINIC | Age: 54
End: 2024-12-26
Payer: OTHER MISCELLANEOUS

## 2024-12-26 DIAGNOSIS — M75.101 TEAR OF RIGHT SUPRASPINATUS TENDON: ICD-10-CM

## 2024-12-26 DIAGNOSIS — Z98.890 STATUS POST ROTATOR CUFF REPAIR: Primary | ICD-10-CM

## 2024-12-26 PROCEDURE — 97110 THERAPEUTIC EXERCISES: CPT

## 2024-12-26 PROCEDURE — 97140 MANUAL THERAPY 1/> REGIONS: CPT

## 2024-12-26 PROCEDURE — 97112 NEUROMUSCULAR REEDUCATION: CPT

## 2024-12-26 NOTE — PROGRESS NOTES
"Daily Note     Today's date: 2024  Patient name: Amari Jenkins  : 1970  MRN: [id number]  Referring provider: Belen Tatum P*  Dx:   Encounter Diagnosis     ICD-10-CM    1. Status post rotator cuff repair  Z98.890       2. Tear of right supraspinatus tendon  M75.101                      Subjective: Pt notes some persisting stiffness.      Objective: See treatment diary below      Assessment: Tolerated treatment well. Patient exhibited good technique with therapeutic exercises. We progressed some exercises as able.      Plan: Continue per plan of care.      Precautions: RC repair DOS 24      DATE    FOTO  Jf 41      MANUALS        Shoulder PROM per protocol  10 min SC JF 10' JF 10 min  ds           Neuro Re-Ed        Scap retraction   20 x  20x  20x NP   Bent over flexion and row 20x ea.  30 x 20 x 20 x 3#  4# 20x                            Ther Ex         BELINDA, row, Triceps extension P1 2x10 P 1  20 x  row / BELINDA  Tri P 2   20 x P 2   20 x  BELINDA  Tri  P 2  20 x P 2  20 x  belinda  Tri  P 2   20 x P2 20x ea   Supine abd/ circles 2# 20x ea.  20 x 2#  20 x 20 x 2# 20x ea`   Elbow Flex/Ext  4#   30 x Standing 3# 4# 30x 4# 30x   Wand press/ flex 3# 30x ea.  30 X 3# 20x 20x ea.  20x  ea   Wall slide   Wall slide flexion 10x 10x 10x    Objective updates        No monies   10x5\" 3\" 15  15x 3\"   Nu-step  no arms 8m L5 8m L5 8m L5 8m L5 NV ube   Ther Activity        Cones   3 x  3 x  3x- OH           Gait Training                Modalities        CP R Shld NT 10' 10' TS 10 min defer                                  "

## 2024-12-31 ENCOUNTER — OFFICE VISIT (OUTPATIENT)
Dept: PHYSICAL THERAPY | Facility: CLINIC | Age: 54
End: 2024-12-31
Payer: OTHER MISCELLANEOUS

## 2024-12-31 DIAGNOSIS — M75.101 TEAR OF RIGHT SUPRASPINATUS TENDON: Primary | ICD-10-CM

## 2024-12-31 DIAGNOSIS — Z98.890 STATUS POST ROTATOR CUFF REPAIR: ICD-10-CM

## 2024-12-31 PROCEDURE — 97110 THERAPEUTIC EXERCISES: CPT

## 2024-12-31 PROCEDURE — 97140 MANUAL THERAPY 1/> REGIONS: CPT

## 2024-12-31 PROCEDURE — 97112 NEUROMUSCULAR REEDUCATION: CPT

## 2024-12-31 NOTE — PROGRESS NOTES
"Daily Note     Today's date: 2024  Patient name: Amari Jenkins  : 1970  MRN: [id number]  Referring provider: Belen Tatum P*  Dx:   Encounter Diagnosis     ICD-10-CM    1. Tear of right supraspinatus tendon  M75.101       2. Status post rotator cuff repair  Z98.890           Start Time: 930  Stop Time: 1015  Total time in clinic (min): 45 minutes    Subjective:  I am using my arm more at home.I am a little sore today.      Objective: See treatment diary below      Assessment: Tolerated treatment well. Patient would benefit from continued PT  we began some new exercises today with good tolerance. He reports having some clicking at times in his right shoulder.  He tolerated all exercises well today.  He had good passive motion today with no pain noted.  He did feel some fatigue in his shoulder today with the standing exercises. We ended with a cold pack to his right shoulder post.       Plan: Continue per plan of care.      Precautions: RC repair DOS 24      DATE    FOTO  Jf 41      MANUALS        Shoulder PROM per protocol  8min  JF 10' JF 10 min  ds           Neuro Re-Ed        Scap retraction   20 x  20x  20x NP   Bent over flexion and row 30x ea.  4#  30 x 20 x 20 x 3#  4# 20x                            Ther Ex         BELINDA, row, Triceps extension P2 3x10 P 1  20 x  row / BELINDA  Tri P 2   20 x P 2   20 x  BELINDA  Tri  P 2  20 x P 2  20 x  belinda  Tri  P 2   20 x P2 20x ea   Standing abd  20 x       scaption 20 x       Supine abd/ circles 3# 20x ea.  20 x 2#  20 x 20 x 2# 20x ea`   Elbow Flex/Ext 4#  30 x 4#   30 x Standing 3# 4# 30x 4# 30x   Wand press/ flex 3# 30x ea.  30 X 3# 20x 20x ea.  20x  ea   Wall slide 20x  Wall slide flexion 10x 10x 10x    Objective updates        ER/ IR  OTB   15x  10x5\" 3\" 15  15x 3\"   Nu-step  no arms 8m L5 8m L5 8m L5 8m L5 NV ube   Ther Activity        Cones 4x   3 x  3 x  3x- OH           Gait Training                Modalities      "   CP R Shld NT 10' 10' TS 10 min defer

## 2025-01-03 ENCOUNTER — OFFICE VISIT (OUTPATIENT)
Dept: PHYSICAL THERAPY | Facility: CLINIC | Age: 55
End: 2025-01-03
Payer: OTHER MISCELLANEOUS

## 2025-01-03 DIAGNOSIS — M75.101 TEAR OF RIGHT SUPRASPINATUS TENDON: ICD-10-CM

## 2025-01-03 DIAGNOSIS — Z98.890 STATUS POST ROTATOR CUFF REPAIR: Primary | ICD-10-CM

## 2025-01-03 PROCEDURE — 97112 NEUROMUSCULAR REEDUCATION: CPT

## 2025-01-03 PROCEDURE — 97110 THERAPEUTIC EXERCISES: CPT

## 2025-01-03 PROCEDURE — 97140 MANUAL THERAPY 1/> REGIONS: CPT

## 2025-01-03 NOTE — PROGRESS NOTES
"Daily Note     Today's date: 1/3/2025  Patient name: Amari Jenkins  : 1970  MRN: [id number]  Referring provider: Belen Tatum P*  Dx:   Encounter Diagnosis     ICD-10-CM    1. Status post rotator cuff repair  Z98.890       2. Tear of right supraspinatus tendon  M75.101           Start Time: 0930  Stop Time: 1015  Total time in clinic (min): 45 minutes    Subjective: My shoulder is about the same .      Objective: See treatment diary below      Assessment: Tolerated treatment well. Patient would benefit from continued PT   pt doing well at home with his everyday activities.  He completed his full program today with some fatigue noted in his right shoulder  and no reports of pain.  We  began prone shoulder ext today . He had good mechanics doing his exercises today.  No substitution noted with standing flex or abduction. He had good passive motion to his right shoulder today with no pain noted. We iced post for 8 min ,.      Plan: Continue per plan of care.      Precautions: RC repair DOS 24      DATE 12/31 1/3/25 12/19 12/24 12/26   FOTO        MANUALS        Shoulder PROM per protocol  8min  JF 10' JF 10 min  ds           Neuro Re-Ed        Scap retraction   20 x  20x  20x NP   Bent over flexion and row 30x ea.  4#  30 x 20 x 20 x 3#  4# 20x                            Ther Ex         BELINDA, row, Triceps extension P2 3x10 P 1  20 x  row / BELINDA  Tri P 2   20 x P 2   20 x  BELINDA  Tri  P 2  20 x P 2  20 x  belinda  Tri  P 2   20 x P2 20x ea   Standing abd  20 x 2#      scaption 20 x 2#      Supine abd/ circles 3# 20x ea.  20 x 2#  20 x 20 x 2# 20x ea`   Elbow Flex/Ext 4#  30 x 4#   30 x Standing 3# 4# 30x 4# 30x   Prone S ext 20 x       Wand press/ flex 3# 30x ea.  30 X 3# 20x 20x ea.  20x  ea   Wall slide 20x 20 x Wall slide flexion 10x 10x 10x    Objective updates        ER/ IR  OTB   15x OTB   15 x 10x5\" 3\" 15  15x 3\"   Nu-step  no arms 8m L5 8m L5 8m L5 8m L5 NV ube   Ther Activity        Cones 4x  4 x "  3 x  3 x  3x- OH           Gait Training                Modalities        CP R Shshyann NT 10' 10' TS 10 min defer                                       Patient

## 2025-01-07 ENCOUNTER — OFFICE VISIT (OUTPATIENT)
Dept: PHYSICAL THERAPY | Facility: CLINIC | Age: 55
End: 2025-01-07
Payer: OTHER MISCELLANEOUS

## 2025-01-07 DIAGNOSIS — M75.101 TEAR OF RIGHT SUPRASPINATUS TENDON: Primary | ICD-10-CM

## 2025-01-07 DIAGNOSIS — Z98.890 STATUS POST ROTATOR CUFF REPAIR: ICD-10-CM

## 2025-01-07 PROCEDURE — 97110 THERAPEUTIC EXERCISES: CPT

## 2025-01-07 PROCEDURE — 97140 MANUAL THERAPY 1/> REGIONS: CPT

## 2025-01-07 PROCEDURE — 97112 NEUROMUSCULAR REEDUCATION: CPT

## 2025-01-07 NOTE — PROGRESS NOTES
Daily Note     Today's date: 2025  Patient name: Amari Jenkins  : 1970  MRN: [id number]  Referring provider: Belen Tatum P*  Dx:   Encounter Diagnosis     ICD-10-CM    1. Tear of right supraspinatus tendon  M75.101       2. Status post rotator cuff repair  Z98.890           Start Time: 930  Stop Time: 1015  Total time in clinic (min): 45 minutes    Subjective:  My shoulder is feeling pretty good. I am doing ok at home with everyday activities.       Objective: See treatment diary below      Assessment: Tolerated treatment well. Patient would benefit from continued PT   pt was given an orange band for home and was told to go easy with the exercises and to stop if there was any pain.  Pt began the UBE today with good tolerance.  Pt was able to increase wt for some exercises today with good tolerance. We continue to follow his protocol as per .  Pt had good passive and active motion today.   Pt needs verbal cues through out to perform the exercises correctly.       Plan: Continue per plan of care.      Precautions: RC repair DOS 24      DATE 12/31 1/3/25 1/7/25 12/24 12/26   FOTO   JF  41      MANUALS        Shoulder PROM per protocol  8min  JF 10' JF 10 min  ds           Neuro Re-Ed        Scap retraction   20 x  20x  20x NP   Bent over flexion and row 30x ea.  4#  30 x 20 x  4#   20 x 3#  4# 20x                            Ther Ex        UBE   L 1          BELINDA, row, Triceps extension P2 3x10 P 1  20 x  row / BELINDA  Tri P 2   20 x P 3   20 x  BELINDA  Tri  P 2  20 x P 2  20 x  belinda  Tri  P 2   20 x P2 20x ea   Standing abd  20 x 2# 2#  20 x     scaption 20 x 2# 2#  20 x     Supine abd/ circles 3# 20x ea.  20 x 2#  20 x 2#  20 x 2# 20x ea`   Elbow Flex/Ext 4#  30 x 4#   30 x Standing 3# 4# 30x 4# 30x   Prone S ext 20 x  30 x     Wand press/ flex 3# 30x ea.  30 X 3# 30 x 3#  20x ea.  20x  ea   Wall slide 20x 20 x Wall slide flexion 20 x 10x 10x    Objective updates        ER/ IR  OTB   15x OTB   " 15 x 10x5\" 3\" 15  15x 3\"   Nu-step  no arms 8m L5 8m L5 8m L5 8m L5 NV ube   Ther Activity        Cones 4x  4 x  4 x  3 x  3x- OH           Gait Training                Modalities        CP R Shld NT 10' 10' TS 10 min defer                                        "

## 2025-01-08 NOTE — PROGRESS NOTES
"Daily Note     Today's date: 2024  Patient name: Amari Jenkins  : 1970  MRN: [id number]  Referring provider: Belen Tatum P*  Dx:   Encounter Diagnosis     ICD-10-CM    1. Tear of right supraspinatus tendon  M75.101       2. Status post rotator cuff repair  Z98.890           Start Time: 930  Stop Time: 1015  Total time in clinic (min): 45 minutes    Subjective:  My shoulder is a little stiff today,. It is doing good though.       Objective: See treatment diary below      Assessment: Tolerated treatment well. Patient would benefit from continued PTpt did well with his exercises today. We continue to work on his motion . He did not have any pain noted besides some soreness.  He had good passive motion today.   Pt reports that over all his shoulder is doing well.       Plan: Continue per plan of care.      Precautions: RC repair DOS 24      DATE 11/7 11/12 10/29 10/31 11/5   FOTO  JF  36      MANUALS   JF       Shoulder PROM per protocol  10 min JF 10' JF JF 10 min JF   Elbow PROM                         Neuro Re-Ed        Scap retraction  30x NV 30 x 30 x 30x   shrugs 30x NV 30 x 30 x 30x                                           Ther Ex                Supine abd/ circles NV 20 x      Wrist AROM all directions  4# 30x 4#  30 x 30 x3#  30 x 3# 3# 30x ea.    Elbow AROM Flex/Ext 30x X30 supine 30 x    30 x supine Standing 30x   AROM Pro/Sup 30x 30x 30 x 30 x 30x   Pendulum S/S, F/B 30x x30 30 x 30 x 30x   Wand press/ flex 20 x  20x ea 10 x 15x    Gripping  35# 30x 35# x40x 35#  40 x 35# 40 x 35# 30x   Nustep no arms L 5  5 min L 3 5 min L 4  5 min   L 4 5 min L5    pulleys 5\" 10 x 5\" 10 x 5\" 10 x 5\" 10 x    Flex bar red  15x      Table slide flexion /abd 5\" 15x 5\" 15 x 5\" 10 x 5\" 10 x    Ther Activity                        Gait Training                        Modalities        CP R Shld  10' 10   10 min post                            " Pt c/o worsening cough , covid negative. Tried calling clinic this am before arriving. Advised he will be seen as long as he is here within 15 minutes of appt time.   Unable to assess due to patient's cognitive impairment

## 2025-01-09 ENCOUNTER — OFFICE VISIT (OUTPATIENT)
Dept: PHYSICAL THERAPY | Facility: CLINIC | Age: 55
End: 2025-01-09
Payer: OTHER MISCELLANEOUS

## 2025-01-09 DIAGNOSIS — M75.101 TEAR OF RIGHT SUPRASPINATUS TENDON: ICD-10-CM

## 2025-01-09 DIAGNOSIS — Z98.890 STATUS POST ROTATOR CUFF REPAIR: Primary | ICD-10-CM

## 2025-01-09 PROCEDURE — 97140 MANUAL THERAPY 1/> REGIONS: CPT

## 2025-01-09 PROCEDURE — 97112 NEUROMUSCULAR REEDUCATION: CPT

## 2025-01-09 PROCEDURE — 97110 THERAPEUTIC EXERCISES: CPT

## 2025-01-09 NOTE — PROGRESS NOTES
"Daily Note     Today's date: 2025  Patient name: Amari Jenkins  : 1970  MRN: [id number]  Referring provider: Belen Tatum P*  Dx:   Encounter Diagnosis     ICD-10-CM    1. Status post rotator cuff repair  Z98.890       2. Tear of right supraspinatus tendon  M75.101           Start Time: 0930  Stop Time: 1015  Total time in clinic (min): 45 minutes    Subjective:  My shoulder is stiff today. I think from the cold weather.       Objective: See treatment diary below      Assessment: Tolerated treatment well. Patient would benefit from continued PT   we were able to increase wt some for a few exercises today with good tolerance.  He reports some mild fatigue at times. He felt most weakness noted with T band ER.  Pt needs verbal and visual cues through out his session to do the exercises correctly.,  pt reports having little to no pain post with some shoulder fatigue noted.       Plan: Continue per plan of care.      Precautions: RC repair DOS 24      DATE 12/31 1/3/25 1/7/25 1/9/25 12/26   FOTO   JF  41      MANUALS        Shoulder PROM per protocol  8min  JF 10' JF 10 min  ds           Neuro Re-Ed        Scap retraction   20 x  20x  20x NP   Bent over flexion and row 30x ea.  4#  30 x 20 x  4#   5# 30 x row bend over 4# 20x                            Ther Ex        UBE   L 1     L 1         BELINDA, row, Triceps extension P2 3x10 P 1  20 x  row / BELINDA  Tri P 2   20 x P 3   20 x  BELINDA  Tri  P 2  20 x P 3  20 x  belinda  Tri  P 2   20 x P2 20x ea   Standing abd  20 x 2# 2#  20 x 2#  20 x    scaption 20 x 2# 2#  20 x 2# 20 x    Supine abd/ circles 3# 20x ea.  20 x 2#  20 x 2#  20 x 3#  2# 20x ea`   Elbow Flex/Ext 4#  30 x 4#   30 x Standing 3# 5# 30x 4# 30x   Prone S ext 20 x  30 x 2# 20 x    Wand  flex 3# 30x ea.  30 X 3# 30 x 3#  30 x  4#  20x  ea   Wall slide 20x 20 x Wall slide flexion 20 x  Wall slide 20 x 10x    Objective updates        ER/ IR  OTB   15x OTB   15 x 10x5\" OTB   20 x  15x 3\" "   Nu-step  no arms 8m L5 8m L5 8m L5  NV ube   Ther Activity        Cones 4x  4 x  4 x  5x 3x- OH           Gait Training                Modalities        CP R Shld NT 10' 10' TS 10 min defer

## 2025-01-14 ENCOUNTER — OFFICE VISIT (OUTPATIENT)
Dept: PHYSICAL THERAPY | Facility: CLINIC | Age: 55
End: 2025-01-14
Payer: OTHER MISCELLANEOUS

## 2025-01-14 DIAGNOSIS — Z98.890 STATUS POST ROTATOR CUFF REPAIR: ICD-10-CM

## 2025-01-14 DIAGNOSIS — M75.101 TEAR OF RIGHT SUPRASPINATUS TENDON: Primary | ICD-10-CM

## 2025-01-14 PROCEDURE — 97110 THERAPEUTIC EXERCISES: CPT

## 2025-01-14 PROCEDURE — 97140 MANUAL THERAPY 1/> REGIONS: CPT

## 2025-01-14 PROCEDURE — 97112 NEUROMUSCULAR REEDUCATION: CPT

## 2025-01-16 ENCOUNTER — EVALUATION (OUTPATIENT)
Dept: PHYSICAL THERAPY | Facility: CLINIC | Age: 55
End: 2025-01-16
Payer: OTHER MISCELLANEOUS

## 2025-01-16 DIAGNOSIS — M75.101 TEAR OF RIGHT SUPRASPINATUS TENDON: Primary | ICD-10-CM

## 2025-01-16 DIAGNOSIS — Z98.890 STATUS POST ROTATOR CUFF REPAIR: ICD-10-CM

## 2025-01-16 PROCEDURE — 97110 THERAPEUTIC EXERCISES: CPT | Performed by: PHYSICAL THERAPIST

## 2025-01-16 PROCEDURE — 97140 MANUAL THERAPY 1/> REGIONS: CPT | Performed by: PHYSICAL THERAPIST

## 2025-01-16 PROCEDURE — 97112 NEUROMUSCULAR REEDUCATION: CPT | Performed by: PHYSICAL THERAPIST

## 2025-01-16 PROCEDURE — 97530 THERAPEUTIC ACTIVITIES: CPT | Performed by: PHYSICAL THERAPIST

## 2025-01-16 NOTE — LETTER
2025    Belen Tatum PA-C  [address] [facility] [address]    Patient: Amari Jenkins   YOB: 1970   Date of Visit: 2025     Encounter Diagnosis     ICD-10-CM    1. Tear of right supraspinatus tendon  M75.101       2. Status post rotator cuff repair  Z98.890           Dear Dr. Tatum:    Thank you for your recent referral of Amari Jenkins. Please review the attached evaluation summary from Amari's recent visit.     Please verify that you agree with the plan of care by signing the attached order.     If you have any questions or concerns, please do not hesitate to call.     I sincerely appreciate the opportunity to share in the care of one of your patients and hope to have another opportunity to work with you in the near future.       Sincerely,    Bolivar Ramirez, PT      Referring Provider:      I certify that I have read the below Plan of Care and certify the need for these services furnished under this plan of treatment while under my care.                    Belen Tatum PA-C  [address] [facility] [address]  Via In Basket          PT Re-Evaluation     Today's date: 2025  Patient name: Amari Jenkins  : 1970  MRN: [id number]  Referring provider: Belen Tatum P*  Dx:   Encounter Diagnosis     ICD-10-CM    1. Tear of right supraspinatus tendon  M75.101       2. Status post rotator cuff repair  Z98.890           Start Time: 0930  Stop Time: 1015  Total time in clinic (min): 45 minutes    Assessment  Impairments: abnormal or restricted ROM, activity intolerance, impaired physical strength, lacks appropriate home exercise program and pain with function  Symptom irritability: low    Assessment details: Patient continues to demonstrate improvements with R shoulder mobility and strength through therapy interventions. He continues with mild to moderate strength deficits to the R shoulder and only minimal rom deficits. He does have slightly more  tenderness to the anterolateral shoulder entering therapy today and during the evaluation but this is more muscular in nature. Slightly modified therapy interventions today to accommodate for increased soreness.   Understanding of Dx/Px/POC: good     Prognosis: good    Goals  ST. Independent with HEP in 2 weeks - MET  2. Pt will have verbal report of improvement in symptoms by >/=25% in 2 weeks - MET    To be achieved by D/C   LT. Pt will improve FOTO score by >/= 5 points in 6 weeks - MET  2. Pt will improve FOTO score to >/= 61 by visit # 24 - Progressing  3. Pt will be able to return to work - Not MET  4. Pt will be able to perform ADLs independently - Progressing  5. Pt will be able to perform household chores independently - Progressing  6. Pt will be able to return to recreational activities without restriction - Not MET  7. Pt will be able to drive with no difficulty - MET      Plan  Patient would benefit from: skilled physical therapy    Planned therapy interventions: activity modification, manual therapy, motor coordination training, neuromuscular re-education, patient education, self care, therapeutic activities, therapeutic exercise, graded activity, home exercise program, graded exercise, functional ROM exercises and strengthening    Frequency: 2x week  Duration in weeks: 5  Plan of Care beginning date: 2025  Plan of Care expiration date: 2025  Treatment plan discussed with: patient  Plan details: Patient will continue to benefit from skilled physical therapy to address the functional deficits that were identified during the evaluation today. We will continue to progress the therapy program to address these functional deficits and achieve the established goals.   Patient informed that from this point forward, to ensure adherence to the aforementioned plan of care, all or some of the treatment may be performed and carried out by a Physical Therapy Assistant (PTA) with supervision from  a licensed Physical Therapist (PT) in accordance with [facility] Physical Therapy Practice Act.            Subjective Evaluation    History of Present Illness  Mechanism of injury: Pt under went RC repair on 9/11/24 for the second time. His original surgery was May 2023. He had no improvement from the surgery and when he was evaluated by a second opinion the tear was still present.     Since the surgery he is continuing to have pain. He notes compl    Pt is a : he would need to be able to lift 75lbs     Update 10/17/2024:  Patient reports pain levels are well controlled. He continues to report compliance to his sling and HEP. He feels that the shoulder is a little stiff but otherwise he is doing quite well. He denies any cardinal signs of infection.     Update 11/21/2024:  Patient reports that his arm is improving. He notes that he still has some soreness with activity but has been refraining from doing any quick movements and heavier lifting. He is still apprehensive to lay on the R shoulder as this will cause irritation to the shoulder.     Update 12/17/2024:  Patient reports that his shoulder is doing better and that he continues to see improvements with his daily activities. He notes that when he is laying on his side. His arm will get tired when he is using it for extended periods of time.     Update 1/16/2025:  Patient reports some increased soreness to the anterior and superior aspects of the R shoulder entering therapy today. He notes that he has more discomfort with active movement than at rest but also notes that he has some discomfort while sleeping. He feels that the increased resistance from his last therapy session made his shoulder fatigued and he is feeling delayed soreness from this but notes no significant changes.   Patient Goals  Patient goals for therapy: decreased pain and return to work  Patient goal: be able to play frisbee with his dogs, be able to ride his ATV,  be able  to go camping,  Pain  Current pain ratin  At best pain ratin  At worst pain ratin  Location: R shoulder  Quality: discomfort and dull ache  Relieving factors: ice, medications, change in position, support and rest    Hand dominance: right          Objective     Active Range of Motion   Cervical/Thoracic Spine       Cervical    Flexion:  WFL  Extension:  Restriction level: moderate  Left lateral flexion:  Restriction level: minimal  Right lateral flexion:  Restriction level minimal  Left rotation:  Restriction level: minimal  Right rotation:  Restriction level: minimal  Left Shoulder   Normal active range of motion    Right Shoulder   Flexion: 150 degrees     Right Wrist   Normal active range of motion    Passive Range of Motion   Left Shoulder   Normal passive range of motion    Right Shoulder   Flexion: 163 degrees   External rotation 90°: 89 degrees   Internal rotation 90°: 66 degrees     Strength/Myotome Testing     Left Shoulder   Normal muscle strength    Right Shoulder     Planes of Motion   Flexion: 4-   Extension: 4   Abduction: 4-   Adduction: 4   External rotation at 0°: 4-   Internal rotation at 0°: 4-              Precautions: RC repair DOS 24  DATE  Reassess 1/3/25 1/7/25 1/9/25 1/14   FOTO 54 SC  JF  41      MANUALS        Shoulder PROM per protocol  8 min IR&ER JF 10' JF 10 min  JF           Neuro Re-Ed        Scap retraction   20 x  20x  20x    Bent over flexion and row 4# 20x ea.  30 x 20 x  4#   5# 30 x row bend over 5#  30 x                           Ther Ex        UBE L2 2'/2'  L 1     L 1     L 2  2/2   Gr band wall slide      20 x    BELINDA, row, Triceps extension P3 2x15 ea.  P 1  20 x  row / BELINDA  Tri P 2   20 x P 3   20 x  BELINDA  Tri  P 2  20 x P 3  20 x  belinda  Tri  P 2   20 x P3 30x ea row/ BELINDA  Tri P 3 30 x   Standing abd   2# 2#  20 x 2#  20 x 2#  20 x   scaption  2# 2#  20 x 2# 20 x 2#  20 x   Supine abd/ circles  20 x 2#  20 x 2#  20 x 3#  2# 20x ea`   Elbow Flex/Ext  " 4#   30 x Standing 3# 5# 30x 5# 30x   Prone S ext   30 x 2# 20 x    Wand  flex  30 X 3# 30 x 3#  30 x  4#  20x  ea   Rear deltoid 15# 2x15       Wall slide  20 x Wall slide flexion 20 x  Wall slide 20 x 10x    Objective updates 10 min       ER/ IR  GTB 20x OTB   15 x 10x5\" OTB   20 x  15x 3\"   Nu-step  no arms  8m L5 8m L5  NV ube   Ther Activity        Cones 3x 5 cones no weight today 4 x  4 x  5x 3x OH  2#            Gait Training                Modalities        CP R Shld  10' 10' TS 10 min defer                  Phase 1 (Weeks 0-6)              Immediate Postoperative Period              Goals:                          Diminish Pain and Inflammation  Maintain and Protect Integrity of the Repair                          Gradually Increase Passive ROM (NO Active or Active Assist until Week 6)                          Become Independent with Modified ADLs              Precautions:  Maintain Arm in Abduction Sling, Remove Only for Directed Exercises (may remove Abduction Pillow after Day 21 for comfort)                          Keep Incisions Clean & Dry (okay to shower in 48 hours, band-aids over incisions)  No Immersion (pool) until Wounds Totally Sealed (usually not prior to day #10)  Passive Shoulder Motion ONLY, No Lifting/Holding Objects, Reaching Behind Back  Okay to Type/Write at Desktop with Arm in Sling              Day 0-6                          Elbow, Wrist, Hand AROM Exercises                           Start Cervical AROM and Scapula Isometrics                          Cryotherapy/Ice for Pain and Inflammation                          Instruct in Hygiene, Posture, and Positioning               Day 7-28                          Continue Above  May Start Pendulum Exercises                          May Start Supine, Pain Free, PT assisted PROM  Forward Flexion to 90°, External Rotation to 35° (Elbow at side), Internal Rotation to Body, Abduction to 60°                          Can Introduce light Cardio " (Walking, Stationary Bike)                          Aqua Therapy may begin at week 3 (day 21) as long as no wound problems              Day 29-42                          Continue Above  Progress PROM to Goal of full PROM by Week 6.  May add Gentle Mobilizations (GH and Scapulothoracic) to Regain full PROM if Needed.  May add Heat prior to PROM Exercises, Ice after Exercises  May Begin AAROM at Day 29 if ROM is Appropriate in Anticipation of AROM Starting at Week 6              Criteria to Progress to Phase 2                          Reasonable Passive Forward Flexion, Abduction , IR/ER                          Time  Phase 2 (Weeks 6-12)              Protection and Active Motion              Goals of Phase:                          Allow Healing of Soft Tissues                          Decrease Pain and Inflammation  Add ADLs and Regain AROM by End of Phase              Precautions:                          NO STRENGTHENING until Phase 3                          Repair is Most Prone to Failure during this Phase!                          No Lifting Objects > 2 lbs (Coffee Cup OK), no Sudden Motions                          Avoid Upper Extremity Bike and Ergometer              Day 43-56                          Discontinue Sling  Initiate AROM Exercises (forward flexion, ER, IR and abduction), Rotator Cuff Isometrics                          Continue Periscapular Exercises, add Stretching if PROM Lacking   No Strengthening until Week 12 (Minimum Time Needed for Cuff Healing Sufficient to withstand Strengthening)                Phase 3 (Weeks 12-16)              Early Strengthening              Goal of Phase:                          Gain full AROM, Maintain PROM                          Gradual return of Shoulder Strength, Power and Endurance                          Gradual return to Functional Activities              Precautions:                          No Lifting > 10lbs, Sudden Lifting or Pushing activities,  Overhead Lifting                          No Upper Extremity Bike or Ergometer              Week 12                          Initiate Strengthening Program (10 lb Maximum until Phase 4)                            ER/IR with Bands (Standing)                            ER in Lateral Decubitius Position                            Lateral Raises                            Full Can in Scapular Plane                            Prone Rowing, Horizontal Abduction, Extension                            Elbow Flexion/Extension              Week 14-16                          Initiate light Functional Activities as Permitted  Progress to Fundamental Shoulder Exercises  Phase 4 (Variable but Weeks 16-24)              Aggressive Rehab  Sport Specific or Activity Specific               Goals of Phase:                          Maintain Full Pain-free AROM                          Advanced Conditioning Exercises for enhanced Functional use                          Continue regaining Shoulder Strength, Power and Endurance                          Eventual return to full Functional Activities              Precautions:                          None              Week 16                          Continue ROM and stretching if appropriate                          Progress Strengthening, Proprioceptive and Neuromuscular Training                          Light Sports if Progressing Well (Chipping/Putting, easy ground strokes etc.)              Week 20                          Continue Strengthening and Stretching                          Initiate Interval Sports Program as Appropriate

## 2025-01-16 NOTE — PROGRESS NOTES
PT Re-Evaluation     Today's date: 2025  Patient name: Amari Jenkins  : 1970  MRN: [id number]  Referring provider: Belen Tatum P*  Dx:   Encounter Diagnosis     ICD-10-CM    1. Tear of right supraspinatus tendon  M75.101       2. Status post rotator cuff repair  Z98.890           Start Time: 930  Stop Time: 1015  Total time in clinic (min): 45 minutes    Assessment  Impairments: abnormal or restricted ROM, activity intolerance, impaired physical strength, lacks appropriate home exercise program and pain with function  Symptom irritability: low    Assessment details: Patient continues to demonstrate improvements with R shoulder mobility and strength through therapy interventions. He continues with mild to moderate strength deficits to the R shoulder and only minimal rom deficits. He does have slightly more tenderness to the anterolateral shoulder entering therapy today and during the evaluation but this is more muscular in nature. Slightly modified therapy interventions today to accommodate for increased soreness.   Understanding of Dx/Px/POC: good     Prognosis: good    Goals  ST. Independent with HEP in 2 weeks - MET  2. Pt will have verbal report of improvement in symptoms by >/=25% in 2 weeks - MET    To be achieved by D/C   LT. Pt will improve FOTO score by >/= 5 points in 6 weeks - MET  2. Pt will improve FOTO score to >/= 61 by visit # 24 - Progressing  3. Pt will be able to return to work - Not MET  4. Pt will be able to perform ADLs independently - Progressing  5. Pt will be able to perform household chores independently - Progressing  6. Pt will be able to return to recreational activities without restriction - Not MET  7. Pt will be able to drive with no difficulty - MET      Plan  Patient would benefit from: skilled physical therapy    Planned therapy interventions: activity modification, manual therapy, motor coordination training, neuromuscular re-education, patient  education, self care, therapeutic activities, therapeutic exercise, graded activity, home exercise program, graded exercise, functional ROM exercises and strengthening    Frequency: 2x week  Duration in weeks: 5  Plan of Care beginning date: 1/16/2025  Plan of Care expiration date: 2/20/2025  Treatment plan discussed with: patient  Plan details: Patient will continue to benefit from skilled physical therapy to address the functional deficits that were identified during the evaluation today. We will continue to progress the therapy program to address these functional deficits and achieve the established goals.   Patient informed that from this point forward, to ensure adherence to the aforementioned plan of care, all or some of the treatment may be performed and carried out by a Physical Therapy Assistant (PTA) with supervision from a licensed Physical Therapist (PT) in accordance with [facility] Physical Therapy Practice Act.            Subjective Evaluation    History of Present Illness  Mechanism of injury: Pt under went RC repair on 9/11/24 for the second time. His original surgery was May 2023. He had no improvement from the surgery and when he was evaluated by a second opinion the tear was still present.     Since the surgery he is continuing to have pain. He notes compl    Pt is a : he would need to be able to lift 75lbs     Update 10/17/2024:  Patient reports pain levels are well controlled. He continues to report compliance to his sling and HEP. He feels that the shoulder is a little stiff but otherwise he is doing quite well. He denies any cardinal signs of infection.     Update 11/21/2024:  Patient reports that his arm is improving. He notes that he still has some soreness with activity but has been refraining from doing any quick movements and heavier lifting. He is still apprehensive to lay on the R shoulder as this will cause irritation to the shoulder.     Update  2024:  Patient reports that his shoulder is doing better and that he continues to see improvements with his daily activities. He notes that when he is laying on his side. His arm will get tired when he is using it for extended periods of time.     Update 2025:  Patient reports some increased soreness to the anterior and superior aspects of the R shoulder entering therapy today. He notes that he has more discomfort with active movement than at rest but also notes that he has some discomfort while sleeping. He feels that the increased resistance from his last therapy session made his shoulder fatigued and he is feeling delayed soreness from this but notes no significant changes.   Patient Goals  Patient goals for therapy: decreased pain and return to work  Patient goal: be able to play frisbee with his dogs, be able to ride his ATV,  be able to go camping,  Pain  Current pain ratin  At best pain ratin  At worst pain ratin  Location: R shoulder  Quality: discomfort and dull ache  Relieving factors: ice, medications, change in position, support and rest    Hand dominance: right          Objective     Active Range of Motion   Cervical/Thoracic Spine       Cervical    Flexion:  WFL  Extension:  Restriction level: moderate  Left lateral flexion:  Restriction level: minimal  Right lateral flexion:  Restriction level minimal  Left rotation:  Restriction level: minimal  Right rotation:  Restriction level: minimal  Left Shoulder   Normal active range of motion    Right Shoulder   Flexion: 150 degrees     Right Wrist   Normal active range of motion    Passive Range of Motion   Left Shoulder   Normal passive range of motion    Right Shoulder   Flexion: 163 degrees   External rotation 90°: 89 degrees   Internal rotation 90°: 66 degrees     Strength/Myotome Testing     Left Shoulder   Normal muscle strength    Right Shoulder     Planes of Motion   Flexion: 4-   Extension: 4   Abduction: 4-   Adduction: 4  "  External rotation at 0°: 4-   Internal rotation at 0°: 4-              Precautions: RC repair DOS 9/11/24  DATE 1/16 Reassess 1/3/25 1/7/25 1/9/25 1/14   FOTO 54 SC  JF  41      MANUALS        Shoulder PROM per protocol  8 min IR&ER JF 10' JF 10 min  JF           Neuro Re-Ed        Scap retraction   20 x  20x  20x    Bent over flexion and row 4# 20x ea.  30 x 20 x  4#   5# 30 x row bend over 5#  30 x                           Ther Ex        UBE L2 2'/2'  L 1   1/1  L 1   1/1  L 2  2/2   Gr band wall slide      20 x    BELINDA, row, Triceps extension P3 2x15 ea.  P 1  20 x  row / BELINDA  Tri P 2   20 x P 3   20 x  BELINDA  Tri  P 2  20 x P 3  20 x  belinda  Tri  P 2   20 x P3 30x ea row/ BELINDA  Tri P 3 30 x   Standing abd   2# 2#  20 x 2#  20 x 2#  20 x   scaption  2# 2#  20 x 2# 20 x 2#  20 x   Supine abd/ circles  20 x 2#  20 x 2#  20 x 3#  2# 20x ea`   Elbow Flex/Ext  4#   30 x Standing 3# 5# 30x 5# 30x   Prone S ext   30 x 2# 20 x    Wand  flex  30 X 3# 30 x 3#  30 x  4#  20x  ea   Rear deltoid 15# 2x15       Wall slide  20 x Wall slide flexion 20 x  Wall slide 20 x 10x    Objective updates 10 min       ER/ IR  GTB 20x OTB   15 x 10x5\" OTB   20 x  15x 3\"   Nu-step  no arms  8m L5 8m L5  NV ube   Ther Activity        Cones 3x 5 cones no weight today 4 x  4 x  5x 3x OH  2#            Gait Training                Modalities        CP R Shld  10' 10' TS 10 min defer                  Phase 1 (Weeks 0-6)              Immediate Postoperative Period              Goals:                          Diminish Pain and Inflammation  Maintain and Protect Integrity of the Repair                          Gradually Increase Passive ROM (NO Active or Active Assist until Week 6)                          Become Independent with Modified ADLs              Precautions:  Maintain Arm in Abduction Sling, Remove Only for Directed Exercises (may remove Abduction Pillow after Day 21 for comfort)                          Keep Incisions Clean & Dry (okay to " shower in 48 hours, band-aids over incisions)  No Immersion (pool) until Wounds Totally Sealed (usually not prior to day #10)  Passive Shoulder Motion ONLY, No Lifting/Holding Objects, Reaching Behind Back  Okay to Type/Write at Desktop with Arm in Sling              Day 0-6                          Elbow, Wrist, Hand AROM Exercises                           Start Cervical AROM and Scapula Isometrics                          Cryotherapy/Ice for Pain and Inflammation                          Instruct in Hygiene, Posture, and Positioning               Day 7-28                          Continue Above  May Start Pendulum Exercises                          May Start Supine, Pain Free, PT assisted PROM  Forward Flexion to 90°, External Rotation to 35° (Elbow at side), Internal Rotation to Body, Abduction to 60°                          Can Introduce light Cardio (Walking, Stationary Bike)                          Aqua Therapy may begin at week 3 (day 21) as long as no wound problems              Day 29-42                          Continue Above  Progress PROM to Goal of full PROM by Week 6.  May add Gentle Mobilizations (GH and Scapulothoracic) to Regain full PROM if Needed.  May add Heat prior to PROM Exercises, Ice after Exercises  May Begin AAROM at Day 29 if ROM is Appropriate in Anticipation of AROM Starting at Week 6              Criteria to Progress to Phase 2                          Reasonable Passive Forward Flexion, Abduction , IR/ER                          Time  Phase 2 (Weeks 6-12)              Protection and Active Motion              Goals of Phase:                          Allow Healing of Soft Tissues                          Decrease Pain and Inflammation  Add ADLs and Regain AROM by End of Phase              Precautions:                          NO STRENGTHENING until Phase 3                          Repair is Most Prone to Failure during this Phase!                          No Lifting Objects > 2 lbs  (Coffee Cup OK), no Sudden Motions                          Avoid Upper Extremity Bike and Ergometer              Day 43-56                          Discontinue Sling  Initiate AROM Exercises (forward flexion, ER, IR and abduction), Rotator Cuff Isometrics                          Continue Periscapular Exercises, add Stretching if PROM Lacking   No Strengthening until Week 12 (Minimum Time Needed for Cuff Healing Sufficient to withstand Strengthening)                Phase 3 (Weeks 12-16)              Early Strengthening              Goal of Phase:                          Gain full AROM, Maintain PROM                          Gradual return of Shoulder Strength, Power and Endurance                          Gradual return to Functional Activities              Precautions:                          No Lifting > 10lbs, Sudden Lifting or Pushing activities, Overhead Lifting                          No Upper Extremity Bike or Ergometer              Week 12                          Initiate Strengthening Program (10 lb Maximum until Phase 4)                            ER/IR with Bands (Standing)                            ER in Lateral Decubitius Position                            Lateral Raises                            Full Can in Scapular Plane                            Prone Rowing, Horizontal Abduction, Extension                            Elbow Flexion/Extension              Week 14-16                          Initiate light Functional Activities as Permitted  Progress to Fundamental Shoulder Exercises  Phase 4 (Variable but Weeks 16-24)              Aggressive Rehab  Sport Specific or Activity Specific               Goals of Phase:                          Maintain Full Pain-free AROM                          Advanced Conditioning Exercises for enhanced Functional use                          Continue regaining Shoulder Strength, Power and Endurance                          Eventual return to full Functional  Activities              Precautions:                          None              Week 16                          Continue ROM and stretching if appropriate                          Progress Strengthening, Proprioceptive and Neuromuscular Training                          Light Sports if Progressing Well (Chipping/Putting, easy ground strokes etc.)              Week 20                          Continue Strengthening and Stretching                          Initiate Interval Sports Program as Appropriate

## 2025-01-21 ENCOUNTER — OFFICE VISIT (OUTPATIENT)
Dept: PHYSICAL THERAPY | Facility: CLINIC | Age: 55
End: 2025-01-21
Payer: OTHER MISCELLANEOUS

## 2025-01-21 DIAGNOSIS — Z98.890 STATUS POST ROTATOR CUFF REPAIR: Primary | ICD-10-CM

## 2025-01-21 DIAGNOSIS — M75.101 TEAR OF RIGHT SUPRASPINATUS TENDON: ICD-10-CM

## 2025-01-21 PROCEDURE — 97530 THERAPEUTIC ACTIVITIES: CPT

## 2025-01-21 PROCEDURE — 97140 MANUAL THERAPY 1/> REGIONS: CPT

## 2025-01-21 PROCEDURE — 97112 NEUROMUSCULAR REEDUCATION: CPT

## 2025-01-21 PROCEDURE — 97110 THERAPEUTIC EXERCISES: CPT

## 2025-01-21 NOTE — PROGRESS NOTES
Daily Note     Today's date: 2025  Patient name: Amari Jenkins  : 1970  MRN: [id number]  Referring provider: Belen Tatum P*  Dx:   Encounter Diagnosis     ICD-10-CM    1. Status post rotator cuff repair  Z98.890       2. Tear of right supraspinatus tendon  M75.101           Start Time: 0930  Stop Time: 1015  Total time in clinic (min): 45 minutes    Subjective: I have some mild soreness today. I think its more the cold weather.       Objective: See treatment diary below      Assessment: Tolerated treatment well. Patient would benefit from continued PT  pt completed his full program today with some mild soreness noted and fatigue.  He is doing well with his exercises and is slowly increasing with the resistance. We will continue with his protocol and progress as able.  He had good passive motion today with little discomfort noted.   He gets most fatigue with over head motions . He is showing improvement with IR and ER.      Plan: Continue per plan of care.      Precautions: RC repair DOS 24  DATE  Reassess 25   FOTO 54 SC  JF  41      MANUALS        Shoulder PROM per protocol  8 min IR&ER JF 10' JF 10 min  JF           Neuro Re-Ed        Scap retraction    20x  20x    Bent over flexion and row 4# 20x ea.  30 x 4#  flex  5#  row 30 x 20 x  4#   5# 30 x row bend over 5#  30 x                           Ther Ex        UBE L2 2'/2' L 2  2/2 L 1   /  L 1     L 2  2/2   Gr band wall slide      20 x    BELINDA, row, Triceps extension P3 2x15 ea.  P 3  20 x  row / BELINDA  Tri P 3    20 x P 3   20 x  BELINDA  Tri  P 2  20 x P 3  20 x  belinda  Tri  P 2   20 x P3 30x ea row/ BELINDA  Tri P 3 30 x   Standing abd   2#  30 x  2#  20 x 2#  20 x 2#  20 x   scaption  2#   30 x 2#  20 x 2# 20 x 2#  20 x   Supine abd/ circles  20 x 3#  20 x 2#  20 x 3#  2# 20x ea`   Elbow Flex/Ext  5#   30 x Standing 3# 5# 30x 5# 30x   Prone S ext  2# 20x 30 x 2# 20 x    Wand  flex  30 X 4 #  PRESS  Flex  4# 20 x  "30 x 3#  30 x  4#  20x  ea   Rear deltoid 15# 2x15 15#  2 x 15      Wall slide   Wall slide flexion 20 x  Wall slide 20 x 10x    Objective updates 10 min       ER/ IR  GTB 20x GTB   15 x 10x5\" OTB   20 x  15x 3\"   Nu-step     8m L5  NV ube   Ther Activity        Cones 3x 5 cones no weight today 2 x   2# 4 x  5x 3x OH  2#            Gait Training                Modalities        CP R Shld  10' 10' TS 10 min defer                    "

## 2025-01-23 ENCOUNTER — OFFICE VISIT (OUTPATIENT)
Dept: PHYSICAL THERAPY | Facility: CLINIC | Age: 55
End: 2025-01-23
Payer: OTHER MISCELLANEOUS

## 2025-01-23 DIAGNOSIS — M75.101 TEAR OF RIGHT SUPRASPINATUS TENDON: Primary | ICD-10-CM

## 2025-01-23 DIAGNOSIS — Z98.890 STATUS POST ROTATOR CUFF REPAIR: ICD-10-CM

## 2025-01-23 PROCEDURE — 97530 THERAPEUTIC ACTIVITIES: CPT

## 2025-01-23 PROCEDURE — 97110 THERAPEUTIC EXERCISES: CPT

## 2025-01-23 PROCEDURE — 97112 NEUROMUSCULAR REEDUCATION: CPT

## 2025-01-23 NOTE — PROGRESS NOTES
Daily Note     Today's date: 2025  Patient name: Amari Jenkins  : 1970  MRN: [id number]  Referring provider: Belen Tatum P*  Dx:   Encounter Diagnosis     ICD-10-CM    1. Tear of right supraspinatus tendon  M75.101       2. Status post rotator cuff repair  Z98.890           Start Time: 930  Stop Time: 1015  Total time in clinic (min): 45 minutes    Subjective:  I have some mild soreness over the front of my right shoulder.       Objective: See treatment diary below      Assessment: Tolerated treatment well. Patient would benefit from continued PT   pt reports having some mild soreness in his shoulder through out his exercises today.   He reports feeling that he is progressing with his strength and motion.,  he had most weakness noted with Er and abduction today.  He had some mild soreness with passive Er at end range and with IR.  The pain noted was very mild as reports by the Pt.  He states having no issues at home so far .       Plan: Continue per plan of care.      Precautions: RC repair DOS 24  DATE  Reassess 25   FOTO 54 SC       MANUALS        Shoulder PROM per protocol  8 min IR&ER JF 10' JF 10 min  JF           Neuro Re-Ed        Scap retraction     20x    Bent over flexion and row 4# 20x ea.  30 x 4#  flex  5#  row 30 x 20 x  4#   5# row  30 x  5# 30 x row bend over 5#  30 x                           Ther Ex        UBE L2 2'/2' L 2  2/2 L 2  3/3 L 1   1/1  L 2  2/2   Gr band wall slide      20 x    BELINDA, row, Triceps extension P3 2x15 ea.  P 3  20 x  row / BELINDA  Tri P 3    20 x P 3   20 x  BELINDA  Tri  P 2  20 x P 3  20 x  belinda  Tri  P 2   20 x P3 30x ea row/ BELINDA  Tri P 3 30 x   Standing abd   2#  30 x  2#  20 x 2#  20 x 2#  20 x   scaption  2#   30 x 2#  20 x 2# 20 x 2#  20 x   Supine abd/ circles  20 x 3#  20 x 2#  20 x 3#  2# 20x ea`   Elbow Flex/Ext  5#   30 x Standing 5 #  5# 30x 5# 30x   Prone S ext  2# 20x 20 x  2#  2# 20 x    Wand  flex  30 X 4 #   "PRESS  Flex  4# 20 x 30 x 3#  30 x  4#  20x  ea   Rear deltoid 15# 2x15 15#  2 x 15 15#  30 x     Wall slide     Wall slide 20 x 10x    Objective updates 10 min       ER/ IR  GTB 20x GTB   15 x 10x5\"  GTB OTB   20 x  15x 3\"   Nu-step     8m L5  NV ube   Ther Activity        Cones 3x 5 cones no weight today 2 x   2#  5x 3x OH  2#            Gait Training                Modalities        CP R Shld  10' 10' TS 10 min defer                      "

## 2025-01-27 ENCOUNTER — OFFICE VISIT (OUTPATIENT)
Dept: PHYSICAL THERAPY | Facility: CLINIC | Age: 55
End: 2025-01-27
Payer: OTHER MISCELLANEOUS

## 2025-01-27 DIAGNOSIS — Z98.890 STATUS POST ROTATOR CUFF REPAIR: ICD-10-CM

## 2025-01-27 DIAGNOSIS — M75.101 TEAR OF RIGHT SUPRASPINATUS TENDON: Primary | ICD-10-CM

## 2025-01-27 PROCEDURE — 97110 THERAPEUTIC EXERCISES: CPT | Performed by: PHYSICAL THERAPIST

## 2025-01-27 PROCEDURE — 97140 MANUAL THERAPY 1/> REGIONS: CPT | Performed by: PHYSICAL THERAPIST

## 2025-01-27 PROCEDURE — 97530 THERAPEUTIC ACTIVITIES: CPT | Performed by: PHYSICAL THERAPIST

## 2025-01-27 NOTE — PROGRESS NOTES
"Daily Note     Today's date: 2025  Patient name: Amari Jenkins  : 1970  MRN: [id number]  Referring provider: Belen Tatum P*  Dx:   Encounter Diagnosis     ICD-10-CM    1. Tear of right supraspinatus tendon  M75.101       2. Status post rotator cuff repair  Z98.890           Start Time: 1015  Stop Time: 1110  Total time in clinic (min): 55 minutes    Subjective: Patient reports continued progress with his R shoulder. He finds that it does fatigue quickly as he does not have the strength in it yet but overall he is doing well.       Objective: See treatment diary below      Assessment: Tolerated treatment well. Patient demonstrated fatigue post treatment, exhibited good technique with therapeutic exercises, and would benefit from continued PT Patient reports overall fatigue to therapy interventions performed today. He offered no reports of pain throughout the session and has mild deficit noted to R shoulder rom during passive stretching with focus on ER.       Plan: Continue per plan of care.  Progress treatment as tolerated.       Precautions: RC repair DOS 24  DATE  Reassess    FOTO 54 SC       MANUALS        Shoulder PROM per protocol  8 min IR&ER JF 10' JF ER 8 min JF           Neuro Re-Ed        Scap retraction         Bent over flexion  4# 20x ea.  30 x 4#  flex  5#  row 30 x 20 x  4#   5# row  30 x  4# 30x 5#  30 x                           Ther Ex        UBE L2 2'/2' L 2  2/2 L 2  3/3 L4 3'/3' L 2  2/2   Gr band wall slide      20 x    OLGA, row P3 2x15 ea.  P 3  20 x  row / OLGA  Tri P 3    20 x P 3   20 x  OLGA  Tri  P 2  20 x P3 30x ea.  P3 30x ea row/ OLGA  Tri P 3 30 x   Standing abd   2#  30 x  2#  20 x 2# 15x flex and abd 2#  20 x   scaption  2#   30 x 2#  20 x  2#  20 x   Supine abd/ circles  20 x 3#  20 x 2#  MREs 30\" 4x 2# 20x ea`   Elbow Flex/Ext  5#   30 x Standing 5 #  6# 30x 5# 30x   Prone S ext  2# 20x 20 x  2#      Wand  flex  30 X 4 #  " "PRESS  Flex  4# 20 x 30 x 3#   20x  ea   Rear deltoid 15# 2x15 15#  2 x 15 15#  30 x     Wall slide     10x    Objective updates 10 min       ER/ IR  GTB 20x GTB   15 x 10x5\"  GTB  15x 3\"   Ther Activity        Cones 3x 5 cones no weight today 2 x   2#  2# 4x 5 cones 3x OH  2#    Body Blade    15\" 4x ea. IR/ER, flex/ext    Gait Training                Modalities        CP R Shld  10' 10' TS 10 min defer                        "

## 2025-01-30 ENCOUNTER — OFFICE VISIT (OUTPATIENT)
Dept: PHYSICAL THERAPY | Facility: CLINIC | Age: 55
End: 2025-01-30
Payer: OTHER MISCELLANEOUS

## 2025-01-30 DIAGNOSIS — Z98.890 STATUS POST ROTATOR CUFF REPAIR: Primary | ICD-10-CM

## 2025-01-30 DIAGNOSIS — M75.101 TEAR OF RIGHT SUPRASPINATUS TENDON: ICD-10-CM

## 2025-01-30 PROCEDURE — 97530 THERAPEUTIC ACTIVITIES: CPT

## 2025-01-30 PROCEDURE — 97140 MANUAL THERAPY 1/> REGIONS: CPT

## 2025-01-30 PROCEDURE — 97110 THERAPEUTIC EXERCISES: CPT

## 2025-01-30 NOTE — PROGRESS NOTES
"Daily Note     Today's date: 2025  Patient name: Amari Jenkins  : 1970  MRN: [id number]  Referring provider: Belen Tatum P*  Dx:   Encounter Diagnosis     ICD-10-CM    1. Status post rotator cuff repair  Z98.890       2. Tear of right supraspinatus tendon  M75.101           Start Time: 930  Stop Time: 1015  Total time in clinic (min): 45 minutes    Subjective:  My shoulder is doing ok. I have some mild pain today.       Objective: See treatment diary below      Assessment: Tolerated treatment well. Patient would benefit from continued PT   pt was able to use 6# today for curls and bent over rows, he reports having some fatigue and some mild soreness at times during his treatment today. We will continue to follow the protocol from the DR. He still has some mild tightness noted at all end ranges, but, his motion is improving.      Plan: Continue per plan of care.      Precautions: RC repair DOS 24  DATE  Reassess    FOTO 54 SC       MANUALS        Shoulder PROM per protocol  8 min IR&ER JF 10' JF ER 8 min JF           Neuro Re-Ed        Scap retraction         Bent over flexion  4# 20x ea.  30 x 4#  flex  5#  row 30 x 20 x  4#   5# row  30 x  4# 30x 5#  30 x  flex  6#  30 x                           Ther Ex        UBE L2 2'/2' L 2  2/2 L 2  3/3 L4 3'/3' L 2  3/3   Gr band wall slide      20 x    OGLA, row P3 2x15 ea.  P 3  20 x  row / OLGA  Tri P 3    20 x P 3   20 x  OLGA  Tri  P 2  20 x P3 30x ea.  P3 30x ea row/ OLGA  Tri P 3 30 x   Standing abd   2#  30 x  2#  20 x 2# 15x flex and abd 3#  20 x   scaption  2#   30 x 2#  20 x  3#  20 x   Supine abd/ circles  20 x 3#  20 x 2#  MREs 30\" 4x 2# 20x ea`   Elbow Flex/Ext  5#   30 x Standing 5 #  6# 30x 6# 30x   Prone S ext  2# 20x 20 x  2#   20x 3#   Wand  flex  30 X 4 #  PRESS  Flex  4# 20 x 30 x 3#      Rear deltoid 15# 2x15 15#  2 x 15 15#  30 x  15 #  30 x   Wall slide        Objective updates 10 min       ER/ IR  GTB " "20x GTB   15 x 10x5\"  GTB  10x 5 \"    Ther Activity        Cones 3x 5 cones no weight today 2 x   2#  2# 4x 5 cones 2#  4x   5 cones   Body Blade    15\" 4x ea. IR/ER, flex/ext 15 \"  4 x   Gait Training                Modalities        CP R Shld  10' 10' TS 10 min defer                          "

## 2025-02-04 ENCOUNTER — OFFICE VISIT (OUTPATIENT)
Dept: PHYSICAL THERAPY | Facility: CLINIC | Age: 55
End: 2025-02-04
Payer: OTHER MISCELLANEOUS

## 2025-02-04 DIAGNOSIS — M75.101 TEAR OF RIGHT SUPRASPINATUS TENDON: ICD-10-CM

## 2025-02-04 DIAGNOSIS — Z98.890 STATUS POST ROTATOR CUFF REPAIR: Primary | ICD-10-CM

## 2025-02-04 PROCEDURE — 97110 THERAPEUTIC EXERCISES: CPT | Performed by: PHYSICAL THERAPIST

## 2025-02-04 PROCEDURE — 97112 NEUROMUSCULAR REEDUCATION: CPT | Performed by: PHYSICAL THERAPIST

## 2025-02-04 PROCEDURE — 97530 THERAPEUTIC ACTIVITIES: CPT | Performed by: PHYSICAL THERAPIST

## 2025-02-04 NOTE — PROGRESS NOTES
"Daily Note     Today's date: 2025  Patient name: Amari Jenkins  : 1970  MRN: [id number]  Referring provider: Belen Tatum P*  Dx:   Encounter Diagnosis     ICD-10-CM    1. Status post rotator cuff repair  Z98.890       2. Tear of right supraspinatus tendon  M75.101           Start Time: 0930  Stop Time: 1020  Total time in clinic (min): 50 minutes    Subjective: Patient repots that his arm continues to become fatigued when working at shoulder level and above. He finds that when he is shaving his face for longer than about 3-4 minutes his arm starts to get tired.       Objective: See treatment diary below      Assessment: Tolerated treatment well. Patient demonstrated fatigue post treatment, exhibited good technique with therapeutic exercises, and would benefit from continued PT Patient with noted fatigue during therapy interventions focused on improving strengthening at shoulder level. He noted no pain just fatigue of the R UE.       Plan: Continue per plan of care.  Progress treatment as tolerated.       Precautions: RC repair DOS 24  DATE    FOTO        MANUALS        Shoulder PROM per protocol   JF 10' JF ER 8 min JF           Neuro Re-Ed        Scap retraction         Standing scaption 4# 2x15 30 x 4#  flex  5#  row 30 x 20 x  4#   5# row  30 x  4# 30x 5#  30 x  flex  6#  30 x                           Ther Ex        UBE L2.5 3'/3' L 2  2/2 L 2  3/3 L4 3'/3' L 2  3/3   Gr band wall slide  20x    20 x    OLGA, row, Triceps extension P4 2x15 EA.  P 3  20 x  row / OLGA  Tri P 3    20 x P 3   20 x  OLGA  Tri  P 2  20 x P3 30x ea.  P3 30x ea row/ OLGA  Tri P 3 30 x   Standing abd  4# 20x 2#  30 x  2#  20 x 2# 15x flex and abd 3#  20 x   Supine abd/ circles  20 x 3#  20 x 2#  MREs 30\" 4x 2# 20x ea`   Wand  flex  30 X 4 #  PRESS  Flex  4# 20 x 30 x 3#      Rear deltoid 25# 2x15 15#  2 x 15 15#  30 x  15 #  30 x   Objective updates        ER/ IR   GTB   15 x 10x5\"  GTB  10x " "5 \"    Ther Activity        Cones 2# 5x 5 cones 2 x   2#  2# 4x 5 cones 2#  4x   5 cones   Body Blade 15\" 4x ea. shoulder height   15\" 4x ea. IR/ER, flex/ext 15 \"  4 x   Gait Training                Modalities        CP R Shld  10' 10' TS 10 min defer                            "

## 2025-02-06 ENCOUNTER — APPOINTMENT (OUTPATIENT)
Dept: PHYSICAL THERAPY | Facility: CLINIC | Age: 55
End: 2025-02-06
Payer: OTHER MISCELLANEOUS

## 2025-02-07 ENCOUNTER — OFFICE VISIT (OUTPATIENT)
Dept: PHYSICAL THERAPY | Facility: CLINIC | Age: 55
End: 2025-02-07
Payer: OTHER MISCELLANEOUS

## 2025-02-07 DIAGNOSIS — M75.101 TEAR OF RIGHT SUPRASPINATUS TENDON: Primary | ICD-10-CM

## 2025-02-07 DIAGNOSIS — Z98.890 STATUS POST ROTATOR CUFF REPAIR: ICD-10-CM

## 2025-02-07 PROCEDURE — 97110 THERAPEUTIC EXERCISES: CPT

## 2025-02-07 PROCEDURE — 97530 THERAPEUTIC ACTIVITIES: CPT

## 2025-02-07 PROCEDURE — 97112 NEUROMUSCULAR REEDUCATION: CPT

## 2025-02-07 NOTE — PROGRESS NOTES
"Daily Note     Today's date: 2025  Patient name: Amari Jenkins  : 1970  MRN: [id number]  Referring provider: Belen Tatum P*  Dx:   Encounter Diagnosis     ICD-10-CM    1. Tear of right supraspinatus tendon  M75.101       2. Status post rotator cuff repair  Z98.890           Start Time: 930  Stop Time: 1015  Total time in clinic (min): 45 minutes    Subjective:  My shoulder is doing ok. I do see the Dr next week.      Objective: See treatment diary below      Assessment: Tolerated treatment well. Patient would benefit from continued PT  pt completed his full program today with some fatigue noted.  He did well with his passive motion as well with only some mild soreness.  We will continue to work on his motion and strength as well as endurance in his shoulder.   He had good passive shoulder motion today . He fatigues most with IR and ER.        Plan: Continue per plan of care.      Precautions: RC repair DOS 24  DATE    FOTO        MANUALS        Shoulder PROM per protocol   JF 10' JF ER 8 min JF           Neuro Re-Ed        Scap retraction         Standing scaption 4# 2x15 30 x 4#  flex  6#  row 30 x 20 x  4#   5# row  30 x  4# 30x 5#  30 x  flex  6#  30 x                           Ther Ex        UBE L2.5 3'/3' L 2.5   3/3 L 2  3/3 L4 3'/3' L 2  3/3   Gr band wall slide  20x    20 x    OLGA, row, Triceps extension P4 2x15 EA.  P 4 2 x 15 x  row / OLGA  Tri P4     30 x  P 3   20 x  OLGA  Tri  P 2  20 x P3 30x ea.  P3 30x ea row/ OLGA  Tri P 3 30 x   Standing abd  4# 20x 2#  30 x  2#  20 x 2# 15x flex and abd 3#  20 x   Supine abd/ circles  20 x 3#  20 x 2#  MREs 30\" 4x 2# 20x ea`   Bicep curls  6# 30 x      Wand  flex  30 X 4 #  PRESS  Flex  4# 20 x 30 x 3#      Rear deltoid 25# 2x15 25#  2 x 15 15#  30 x  15 #  30 x   Objective updates        ER/ IR   GTB   20 x 10x5\"  GTB  10x 5 \"    Ther Activity        Cones 2# 5x 5 cones 2 x   2#  2# 4x 5 cones 2#  4x   5 cones " "  Body Blade 15\" 4x ea. shoulder height 15 \"  4 x  15\" 4x ea. IR/ER, flex/ext 15 \"  4 x   Gait Training                Modalities        CP R Shld  10' 10' TS 10 min defer                              "

## 2025-02-10 ENCOUNTER — OFFICE VISIT (OUTPATIENT)
Dept: OBGYN CLINIC | Facility: OTHER | Age: 55
End: 2025-02-10
Payer: OTHER MISCELLANEOUS

## 2025-02-10 VITALS — HEIGHT: 71 IN | WEIGHT: 200 LBS | BODY MASS INDEX: 28 KG/M2

## 2025-02-10 DIAGNOSIS — Z98.890 S/P RIGHT ROTATOR CUFF REPAIR: Primary | ICD-10-CM

## 2025-02-10 DIAGNOSIS — M75.101 TEAR OF RIGHT SUPRASPINATUS TENDON: ICD-10-CM

## 2025-02-10 PROCEDURE — 99213 OFFICE O/P EST LOW 20 MIN: CPT | Performed by: PHYSICIAN ASSISTANT

## 2025-02-10 NOTE — ASSESSMENT & PLAN NOTE
Continue with formal PT - progress with strengthening and transition to work conditioning as shoulder allows  HEP - per therapist  Slowly increase activities to tolerance  Work restrictions - Per Dr Colin  Follow up - 6 weeks (Sooner if problems arise)

## 2025-02-10 NOTE — PROGRESS NOTES
"  Assessment & Plan  S/P right rotator cuff repair    Tear of right supraspinatus tendon  Continue with formal PT - progress with strengthening and transition to work conditioning as shoulder allows  HEP - per therapist  Slowly increase activities to tolerance  Work restrictions - Per Dr Colin  Follow up - 6 weeks (Sooner if problems arise)    Subjective:   Patient ID: Amari Jenkins is a 55 y.o. male    Amari presents to the office in follow up of the right shoulder.  He is 5 months s/p revision rotator cuff repair.  He is progressing with therapy.  He is doing more around the house without significant pain or limitation.  He states he only ADLs he struggles with is shaving.  He says he gets pain and shoulder fatigues.  Therapy just started progressing with weights and Amari has some soreness with that.  Denies pain that interferes with sleep.  Recently tried sleeping directly on the shoulder and was able to sleep about 3 hours before pain woke him and he had to roll to his back.  Denies paresthesias.  Denies issues with therapy.      The following portions of the patient's history were reviewed and updated as appropriate: allergies, current medications, past family history, past medical history, past social history, past surgical history and problem list.    Review of Systems   Constitutional:  Negative for chills and fever.   HENT:  Negative for hearing loss.    Eyes:  Negative for visual disturbance.   Respiratory:  Negative for shortness of breath.    Cardiovascular:  Negative for chest pain.   Gastrointestinal:  Negative for abdominal pain.   Musculoskeletal:         As reviewed in the HPI   Skin:  Negative for rash.   Neurological:         As reviewed in the HPI   Psychiatric/Behavioral:  Negative for agitation.        Objective:  Ht 5' 11\" (1.803 m)   Wt 90.7 kg (200 lb)   BMI 27.89 kg/m²       Right Shoulder Exam     Tenderness   The patient is experiencing no tenderness.    Range of Motion   The " patient has normal right shoulder ROM.    Muscle Strength   External rotation: 5/5   Supraspinatus: 5/5     Tests   Terry test: negative  Impingement: negative  Drop arm: negative    Other   Erythema: absent  Sensation: normal  Pulse: present    Comments:  Neg [place]  Neg Speeds            Physical Exam  Constitutional:       Appearance: He is well-developed.   HENT:      Head: Normocephalic.   Eyes:      Extraocular Movements: Extraocular movements intact.   Pulmonary:      Effort: No respiratory distress.      Breath sounds: No wheezing.   Musculoskeletal:      Cervical back: Normal range of motion.   Skin:     General: Skin is warm and dry.   Neurological:      Mental Status: He is alert and oriented to person, place, and time.   Psychiatric:         Behavior: Behavior normal.         Thought Content: Thought content normal.         Judgment: Judgment normal.             Records Reviewed: Physical therapy notes

## 2025-02-10 NOTE — LETTER
February 10, 2025     Renard Rogel MD  [address] [place] [id number]    Patient: Amari Jenkins   YOB: 1970   Date of Visit: 2/10/2025       Dear Dr. Rogel:    Thank you for referring Amari Jenkins to me for evaluation. Below are my notes for this consultation.    If you have questions, please do not hesitate to call me. I look forward to following your patient along with you.         Sincerely,        Belen Tatum PA-C        CC: Amari Colin MD

## 2025-02-11 ENCOUNTER — OFFICE VISIT (OUTPATIENT)
Dept: PHYSICAL THERAPY | Facility: CLINIC | Age: 55
End: 2025-02-11
Payer: OTHER MISCELLANEOUS

## 2025-02-11 DIAGNOSIS — Z98.890 STATUS POST ROTATOR CUFF REPAIR: Primary | ICD-10-CM

## 2025-02-11 DIAGNOSIS — M75.101 TEAR OF RIGHT SUPRASPINATUS TENDON: ICD-10-CM

## 2025-02-11 PROCEDURE — 97112 NEUROMUSCULAR REEDUCATION: CPT

## 2025-02-11 PROCEDURE — 97530 THERAPEUTIC ACTIVITIES: CPT

## 2025-02-11 PROCEDURE — 97110 THERAPEUTIC EXERCISES: CPT

## 2025-02-11 NOTE — PROGRESS NOTES
"Daily Note     Today's date: 2025  Patient name: Amari Jenkins  : 1970  MRN: [id number]  Referring provider: Belen Tatum P*  Dx:   Encounter Diagnosis     ICD-10-CM    1. Status post rotator cuff repair  Z98.890       2. Tear of right supraspinatus tendon  M75.101           Start Time: 930  Stop Time: 1015  Total time in clinic (min): 45 minutes    Subjective:  My shoulder is ok today. I saw the Dr yesterday and he was happy how my rehab is going.       Objective: See treatment diary below      Assessment: Tolerated treatment well. Patient would benefit from continued PT     pt completed his full program today with some shoulder fatigue , but, had no pain noted. He had good active and passive motion today. He still reports feeling that his shoulder is not there yet.  He was told that its will take longer for him to get his strength back. The Dr also told him that it will take months for his full strength to come back.,       Plan: Continue per plan of care.      Precautions: RC repair DOS 24  DATE    FOTO        MANUALS        Shoulder PROM per protocol   JF 10' JF ER 8 min JF           Neuro Re-Ed        Scap retraction         Standing scaption 4# 2x15 30 x 4#  flex  6#  row 30 x 20 x  4#   6# row  30 x  4# 30x 5#  30 x  flex  6#  30 x                           Ther Ex        UBE L2.5 3'/3' L 2.5   3/3 L 2  3/3 L4 3'/3' L 2  3/3   Gr band wall slide  20x    20 x    OGLA, row, Triceps extension P4 2x15 EA.  P 4 2 x 15 x  row / OLGA  Tri P4     30 x  P 4   20 x  OLGA  Tri  P 4  20 x P3 30x ea.  P3 30x ea row/ OLGA  Tri P 3 30 x   Standing abd  4# 20x 2#  30 x  2#  30 x 2# 15x flex and abd 3#  20 x   Supine abd/ circles  20 x 3#  20 x 3# MREs 30\" 4x 2# 20x ea`   Bicep curls  6# 30 x 6#  30 x     Wand  flex  30 X 4 #  PRESS  Flex  4# 20 x 30 x      Rear deltoid 25# 2x15 25#  2 x 15 25#  30 x  15 #  30 x   Objective updates        ER/ IR   GTB   20 x 10x5\"  GTB  10x 5 \"  " "  Ther Activity        Cones 2# 5x 5 cones 2 x   2# 2#  3x 2# 4x 5 cones 2#  4x   5 cones   Body Blade 15\" 4x ea. shoulder height 15 \"  4 x 15\" 4 x 15\" 4x ea. IR/ER, flex/ext 15 \"  4 x   Gait Training                Modalities        CP R Shld  10' 10' TS 10 min defer                                "

## 2025-02-13 ENCOUNTER — OFFICE VISIT (OUTPATIENT)
Dept: PHYSICAL THERAPY | Facility: CLINIC | Age: 55
End: 2025-02-13
Payer: OTHER MISCELLANEOUS

## 2025-02-13 DIAGNOSIS — Z98.890 STATUS POST ROTATOR CUFF REPAIR: Primary | ICD-10-CM

## 2025-02-13 DIAGNOSIS — M75.101 TEAR OF RIGHT SUPRASPINATUS TENDON: ICD-10-CM

## 2025-02-13 PROCEDURE — 97112 NEUROMUSCULAR REEDUCATION: CPT | Performed by: PHYSICAL THERAPIST

## 2025-02-13 PROCEDURE — 97140 MANUAL THERAPY 1/> REGIONS: CPT | Performed by: PHYSICAL THERAPIST

## 2025-02-13 PROCEDURE — 97530 THERAPEUTIC ACTIVITIES: CPT | Performed by: PHYSICAL THERAPIST

## 2025-02-13 PROCEDURE — 97110 THERAPEUTIC EXERCISES: CPT | Performed by: PHYSICAL THERAPIST

## 2025-02-13 NOTE — PROGRESS NOTES
Daily Note     Today's date: 2025  Patient name: Amari Jenkins  : 1970  MRN: [id number]  Referring provider: Belen Tatum P*  Dx:   Encounter Diagnosis     ICD-10-CM    1. Status post rotator cuff repair  Z98.890       2. Tear of right supraspinatus tendon  M75.101           Start Time: 930  Stop Time: 1015  Total time in clinic (min): 45 minutes    Subjective: Patient reports that his shoulder continues to slowly improve. He notes that shaving is still difficult for him as his arm fatigues and he has increased discomfort with this activity.       Objective: See treatment diary below      Assessment: Tolerated treatment well. Patient demonstrated fatigue post treatment, exhibited good technique with therapeutic exercises, and would benefit from continued PT Patient continues to respond positively to progression of strengthening interventions. He continues to have improved tolerance for activities at and above shoulder level which are focusing of functional movements.       Plan: Continue per plan of care.  Progress treatment as tolerated.   Progress treament per protocol.      Precautions: RC repair DOS 24  DATE    FOTO  53 JF      MANUALS        Shoulder PROM per protocol   JF 10' JF 10 min SC JF           Neuro Re-Ed        Scap retraction         Standing scaption 4# 2x15 30 x 4#  flex  6#  row 30 x 20 x  4#   6# row  30 x  4# 2x15 5#  30 x  flex  6#  30 x                           Ther Ex        UBE L2.5 3'/3' L 2.5   3/3 L 2  3/3 L3 3'/3' L 2  3/3   Gr band wall slide  20x    20 x    OLGA, row, Triceps extension P4 2x15 EA.  P 4 2 x 15 x  row / OLGA  Tri P4     30 x  P 4   20 x  OLGA  Tri  P 4  20 x P5 Row 2x15  P4 OLGA, Triceps 2x15 P3 30x ea row/ OLGA  Tri P 3 30 x   Standing abd  4# 20x 2#  30 x  2#  30 x 3# 2x15 3#  20 x   Supine abd/ circles  20 x 3#  20 x 3#  2# 20x ea`   Bicep curls  6# 30 x 6#  30 x     Chest press  30 X 4 #  PRESS  Flex  4# 20 x 30 x  15#  "2x15    Rear deltoid 25# 2x15 25#  2 x 15 25#  30 x 30# 30x 15 #  30 x   Objective updates        ER/ IR   GTB   20 x 10x5\"  GTB  10x 5 \"    Ther Activity        Cones 2# 5x 5 cones 2 x   2# 2#  3x 3# 3x 5 cones 2#  4x   5 cones   Body Blade 15\" 4x ea. shoulder height 15 \"  4 x 15\" 4 x 20\" 4x ea shoulder height 15 \"  4 x   Gait Training                Modalities        CP R Shld  10' 10' TS  defer                                  "

## 2025-02-18 ENCOUNTER — OFFICE VISIT (OUTPATIENT)
Dept: PHYSICAL THERAPY | Facility: CLINIC | Age: 55
End: 2025-02-18
Payer: OTHER MISCELLANEOUS

## 2025-02-18 DIAGNOSIS — M75.101 TEAR OF RIGHT SUPRASPINATUS TENDON: Primary | ICD-10-CM

## 2025-02-18 DIAGNOSIS — Z98.890 STATUS POST ROTATOR CUFF REPAIR: ICD-10-CM

## 2025-02-18 PROCEDURE — 97140 MANUAL THERAPY 1/> REGIONS: CPT

## 2025-02-18 PROCEDURE — 97112 NEUROMUSCULAR REEDUCATION: CPT

## 2025-02-18 PROCEDURE — 97110 THERAPEUTIC EXERCISES: CPT

## 2025-02-18 NOTE — PROGRESS NOTES
"Daily Note     Today's date: 2025  Patient name: Amari Jenkins  : 1970  MRN: [id number]  Referring provider: Belen Tatum P*  Dx:   Encounter Diagnosis     ICD-10-CM    1. Tear of right supraspinatus tendon  M75.101       2. Status post rotator cuff repair  Z98.890                      Subjective:  My shoulder is a bit sore and stiff. The weather is bothering me some.      Objective: See treatment diary below      Assessment: Tolerated treatment well. Patient would benefit from continued PT    pt completed his full program today with some fatigue and mild soreness noted.  He reports that his strength is improving as well as motion. He feels his everyday activities are easier to do.  We will slowly increase his wt as he is able.       Plan: Continue per plan of care.      Precautions: RC repair DOS 24  DATE    FOTO  53 JF      MANUALS        Shoulder PROM per protocol   JF 10' JF 10 min SC JF           Neuro Re-Ed        Scap retraction         Standing scaption 4# 2x15 30 x 4#  flex  6#  row 30 x 20 x  4#   6# row  30 x  4# 2x15 5#  30 x  flex  6#  30 x                           Ther Ex        UBE L2.5 3'/3' L 2.5   3/3 L 2  3/3 L3 3'/3' L 2  3/3   Gr band wall slide  20x    20 x    OLGA, row, Triceps extension P4 2x15 EA.  P 4 2 x 15 x  row / OLGA  Tri P4     30 x  P 4   20 x  OLGA  Tri  P 4  20 x P5 Row 2x15  P4 OLGA, Triceps 2x15 P5 30x ea row/ OLGA  Tri P 4  30 x   Standing abd  4# 20x 2#  30 x  2#  30 x 3# 2x15 3#  20 x   Supine abd/ circles  20 x 3#  20 x 3#     Bicep curls  6# 30 x 6#  30 x  6#  30 x    Chest press  30 X 4 #  PRESS  Flex  4# 20 x 30 x  15# 2x15 15 #  2 x 15    Rear deltoid 25# 2x15 25#  2 x 15 25#  30 x 30# 30x 30  #  30 x   Objective updates        ER/ IR   GTB   20 x 10x5\"  GTB  10x 5 \"    Ther Activity        Cones 2# 5x 5 cones 2 x   2# 2#  3x 3# 3x 5 cones 2#  4x   5 cones   Body Blade 15\" 4x ea. shoulder height 15 \"  4 x 15\" 4 x 20\" 4x ea " "shoulder height 15 \"  4 x   Gait Training                Modalities        CP R Shld  10' 10' TS  defer                                    "

## 2025-02-20 ENCOUNTER — EVALUATION (OUTPATIENT)
Dept: PHYSICAL THERAPY | Facility: CLINIC | Age: 55
End: 2025-02-20
Payer: OTHER MISCELLANEOUS

## 2025-02-20 DIAGNOSIS — M75.101 TEAR OF RIGHT SUPRASPINATUS TENDON: Primary | ICD-10-CM

## 2025-02-20 DIAGNOSIS — Z98.890 STATUS POST ROTATOR CUFF REPAIR: ICD-10-CM

## 2025-02-20 PROCEDURE — 97140 MANUAL THERAPY 1/> REGIONS: CPT | Performed by: PHYSICAL THERAPIST

## 2025-02-20 PROCEDURE — 97110 THERAPEUTIC EXERCISES: CPT | Performed by: PHYSICAL THERAPIST

## 2025-02-20 PROCEDURE — 97530 THERAPEUTIC ACTIVITIES: CPT | Performed by: PHYSICAL THERAPIST

## 2025-02-20 PROCEDURE — 97112 NEUROMUSCULAR REEDUCATION: CPT | Performed by: PHYSICAL THERAPIST

## 2025-02-20 NOTE — LETTER
2025    Belen Tatum PA-C  [address] [facility] [place] [address]    Patient: Amari Jenkins   YOB: 1970   Date of Visit: 2025     Encounter Diagnosis     ICD-10-CM    1. Tear of right supraspinatus tendon  M75.101       2. Status post rotator cuff repair  Z98.890           Dear Dr. Tatum:    Thank you for your recent referral of Amari Jenkins. Please review the attached evaluation summary from Amari's recent visit.     Please verify that you agree with the plan of care by signing the attached order.     If you have any questions or concerns, please do not hesitate to call.     I sincerely appreciate the opportunity to share in the care of one of your patients and hope to have another opportunity to work with you in the near future.       Sincerely,    Bolivar Ramirez, PT      Referring Provider:      I certify that I have read the below Plan of Care and certify the need for these services furnished under this plan of treatment while under my care.                    Belen Tatum PA-C  [address] [facility] [place] [address]  Via In Basket          PT Re-Evaluation     Today's date: 2025  Patient name: Amari Jenkins  : 1970  MRN: [id number]  Referring provider: Belen Tatum P*  Dx:   Encounter Diagnosis     ICD-10-CM    1. Tear of right supraspinatus tendon  M75.101       2. Status post rotator cuff repair  Z98.890           Start Time: 0845  Stop Time: 0950  Total time in clinic (min): 65 minutes    Assessment  Impairments: abnormal or restricted ROM, activity intolerance, impaired physical strength, lacks appropriate home exercise program and pain with function  Symptom irritability: low    Assessment details: Patient continues to show improvements with R shoulder mobility and strength per the assessment today. He has mild discomfort at end range of passive IR stretch which is more limited with scapular stabilization. There is mild restriction noted  to the posterior aspect of the R shoulder during mobility assessment. Continued to progress strengthening interventions to the R shoulder with reports of fatigue but minimal reports of pain noted throughout the session.   Understanding of Dx/Px/POC: good     Prognosis: good    Goals  ST. Independent with HEP in 2 weeks - MET  2. Pt will have verbal report of improvement in symptoms by >/=25% in 2 weeks - MET    To be achieved by D/C   LT. Pt will improve FOTO score by >/= 5 points in 6 weeks - MET  2. Pt will improve FOTO score to >/= 61 by visit # 24 - Progressing  3. Pt will be able to return to work - Not MET  4. Pt will be able to perform ADLs independently - MET  5. Pt will be able to perform household chores independently - Progressing  6. Pt will be able to return to recreational activities without restriction - Progressing  7. Pt will be able to drive with no difficulty - MET      Plan  Patient would benefit from: skilled physical therapy    Planned therapy interventions: activity modification, manual therapy, motor coordination training, neuromuscular re-education, patient education, self care, therapeutic activities, therapeutic exercise, graded activity, home exercise program, graded exercise, functional ROM exercises and strengthening    Frequency: 2x week  Duration in weeks: 6  Plan of Care beginning date: 2025  Plan of Care expiration date: 4/3/2025  Treatment plan discussed with: patient  Plan details: Patient will continue to benefit from skilled physical therapy to address the functional deficits that were identified during the evaluation today. We will continue to progress the therapy program to address these functional deficits and achieve the established goals.   Patient informed that from this point forward, to ensure adherence to the aforementioned plan of care, all or some of the treatment may be performed and carried out by a Physical Therapy Assistant (PTA) with supervision  from a licensed Physical Therapist (PT) in accordance with [facility] Physical Therapy Practice Act.            Subjective Evaluation    History of Present Illness  Mechanism of injury: Pt under went RC repair on 9/11/24 for the second time. His original surgery was May 2023. He had no improvement from the surgery and when he was evaluated by a second opinion the tear was still present.     Since the surgery he is continuing to have pain. He notes compl    Pt is a : he would need to be able to lift 75lbs     Update 10/17/2024:  Patient reports pain levels are well controlled. He continues to report compliance to his sling and HEP. He feels that the shoulder is a little stiff but otherwise he is doing quite well. He denies any cardinal signs of infection.     Update 11/21/2024:  Patient reports that his arm is improving. He notes that he still has some soreness with activity but has been refraining from doing any quick movements and heavier lifting. He is still apprehensive to lay on the R shoulder as this will cause irritation to the shoulder.     Update 12/17/2024:  Patient reports that his shoulder is doing better and that he continues to see improvements with his daily activities. He notes that when he is laying on his side. His arm will get tired when he is using it for extended periods of time.     Update 1/16/2025:  Patient reports some increased soreness to the anterior and superior aspects of the R shoulder entering therapy today. He notes that he has more discomfort with active movement than at rest but also notes that he has some discomfort while sleeping. He feels that the increased resistance from his last therapy session made his shoulder fatigued and he is feeling delayed soreness from this but notes no significant changes.     Update 2/20/2025:  Patient reports slow improvements with his daily activities and finds that shaving is getting easier but his arm still fatigues after some  "time shaving. He feels that overhead reaching and reaching forward is also getting easier and he has less pain and more range during these activities.   Patient Goals  Patient goals for therapy: decreased pain and return to work  Patient goal: be able to play frisbee with his dogs, be able to ride his ATV,  be able to go camping,  Pain  Current pain ratin  At best pain ratin  At worst pain rating: 3  Location: R shoulder  Quality: discomfort and dull ache  Relieving factors: ice, medications, change in position, support and rest    Hand dominance: right          Objective     Active Range of Motion   Cervical/Thoracic Spine       Cervical    Flexion:  WFL  Extension:  Restriction level: moderate  Left lateral flexion:  Restriction level: minimal  Right lateral flexion:  Restriction level minimal  Left rotation:  Restriction level: minimal  Right rotation:  Restriction level: minimal  Left Shoulder   Normal active range of motion    Right Shoulder   Flexion: 157 degrees     Right Wrist   Normal active range of motion    Passive Range of Motion   Left Shoulder   Normal passive range of motion    Right Shoulder   Flexion: 165 degrees   External rotation 90°: 93 degrees   Internal rotation 90°: 65 degrees     Strength/Myotome Testing     Left Shoulder   Normal muscle strength    Right Shoulder     Planes of Motion   Flexion: 4   Extension: 4   Abduction: 4-   Adduction: 4+   External rotation at 0°: 4-   Internal rotation at 0°: 4-              Precautions: RC repair DOS 24  DATE  Reassess    FOTO 56 SC 53 JF      MANUALS        Shoulder PROM per protocol  10 min SC JF 10' JF 10 min [facility]           Neuro Re-Ed        Scap retraction         Standing scaption 4# 2x15 30 x 4#  flex  6#  row 30 x 20 x  4#   6# row  30 x  4# 2x15 5#  30 x  flex  6#  30 x   Side lying ER 2# 5\" 20x                       Ther Ex        UBE L3.2 3'/3' L 2.5   3/3 L 2  3/3 L3 3'/3' L 2  3/3   Gr band wall slide " "     20 x    OLGA, row, Triceps extension P5 2x15 row, OLGA  P4 2x15 triceps ext P 4 2 x 15 x  row / OLGA  Tri P4     30 x  P 4   20 x  OLGA  Tri  P 4  20 x P5 Row 2x15  P4 OLGA, Triceps 2x15 P5 30x ea row/ OLGA  Tri P 4  30 x   Standing abd   2#  30 x  2#  30 x 3# 2x15 3#  20 x   Supine abd/ circles  20 x 3#  20 x 3#     Bicep curls  6# 30 x 6#  30 x  6#  30 x    Chest press 20# 2x15 30 X 4 #  PRESS  Flex  4# 20 x 30 x  15# 2x15 15 #  2 x 15    Rear deltoid 35# 30x 25#  2 x 15 25#  30 x 30# 30x 30  #  30 x   Objective updates 10 min       ER/ IR   GTB   20 x 10x5\"  GTB  10x 5 \"    Ther Activity        Cones 3# 4x 5 cones 2 x   2# 2#  3x 3# 3x 5 cones 2#  4x   5 cones   Body Blade 20\" 3x ea.  15 \"  4 x 15\" 4 x 20\" 4x ea shoulder height 15 \"  4 x   Gait Training                Modalities        CP R Shld  10' 10' TS  defer                  Phase 1 (Weeks 0-6)              Immediate Postoperative Period              Goals:                          Diminish Pain and Inflammation  Maintain and Protect Integrity of the Repair                          Gradually Increase Passive ROM (NO Active or Active Assist until Week 6)                          Become Independent with Modified ADLs              Precautions:  Maintain Arm in Abduction Sling, Remove Only for Directed Exercises (may remove Abduction Pillow after Day 21 for comfort)                          Keep Incisions Clean & Dry (okay to shower in 48 hours, band-aids over incisions)  No Immersion (pool) until Wounds Totally Sealed (usually not prior to day #10)  Passive Shoulder Motion ONLY, No Lifting/Holding Objects, Reaching Behind Back  Okay to Type/Write at Desktop with Arm in Sling              Day 0-6                          Elbow, Wrist, Hand AROM Exercises                           Start Cervical AROM and Scapula Isometrics                          Cryotherapy/Ice for Pain and Inflammation                          Instruct in Hygiene, Posture, and Positioning      "          Day 7-28                          Continue Above  May Start Pendulum Exercises                          May Start Supine, Pain Free, PT assisted PROM  Forward Flexion to 90°, External Rotation to 35° (Elbow at side), Internal Rotation to Body, Abduction to 60°                          Can Introduce light Cardio (Walking, Stationary Bike)                          Aqua Therapy may begin at week 3 (day 21) as long as no wound problems              Day 29-42                          Continue Above  Progress PROM to Goal of full PROM by Week 6.  May add Gentle Mobilizations (GH and Scapulothoracic) to Regain full PROM if Needed.  May add Heat prior to PROM Exercises, Ice after Exercises  May Begin AAROM at Day 29 if ROM is Appropriate in Anticipation of AROM Starting at Week 6              Criteria to Progress to Phase 2                          Reasonable Passive Forward Flexion, Abduction , IR/ER                          Time  Phase 2 (Weeks 6-12)              Protection and Active Motion              Goals of Phase:                          Allow Healing of Soft Tissues                          Decrease Pain and Inflammation  Add ADLs and Regain AROM by End of Phase              Precautions:                          NO STRENGTHENING until Phase 3                          Repair is Most Prone to Failure during this Phase!                          No Lifting Objects > 2 lbs (Coffee Cup OK), no Sudden Motions                          Avoid Upper Extremity Bike and Ergometer              Day 43-56                          Discontinue Sling  Initiate AROM Exercises (forward flexion, ER, IR and abduction), Rotator Cuff Isometrics                          Continue Periscapular Exercises, add Stretching if PROM Lacking   No Strengthening until Week 12 (Minimum Time Needed for Cuff Healing Sufficient to withstand Strengthening)                Phase 3 (Weeks 12-16)              Early Strengthening              Goal of  Phase:                          Gain full AROM, Maintain PROM                          Gradual return of Shoulder Strength, Power and Endurance                          Gradual return to Functional Activities              Precautions:                          No Lifting > 10lbs, Sudden Lifting or Pushing activities, Overhead Lifting                          No Upper Extremity Bike or Ergometer              Week 12                          Initiate Strengthening Program (10 lb Maximum until Phase 4)                            ER/IR with Bands (Standing)                            ER in Lateral Decubitius Position                            Lateral Raises                            Full Can in Scapular Plane                            Prone Rowing, Horizontal Abduction, Extension                            Elbow Flexion/Extension              Week 14-16                          Initiate light Functional Activities as Permitted  Progress to Fundamental Shoulder Exercises  Phase 4 (Variable but Weeks 16-24)              Aggressive Rehab  Sport Specific or Activity Specific               Goals of Phase:                          Maintain Full Pain-free AROM                          Advanced Conditioning Exercises for enhanced Functional use                          Continue regaining Shoulder Strength, Power and Endurance                          Eventual return to full Functional Activities              Precautions:                          None              Week 16                          Continue ROM and stretching if appropriate                          Progress Strengthening, Proprioceptive and Neuromuscular Training                          Light Sports if Progressing Well (Chipping/Putting, easy ground strokes etc.)              Week 20                          Continue Strengthening and Stretching                          Initiate Interval Sports Program as Appropriate

## 2025-02-20 NOTE — PROGRESS NOTES
PT Re-Evaluation     Today's date: 2025  Patient name: Amari Jenkins  : 1970  MRN: [id number]  Referring provider: Belen Tatum P*  Dx:   Encounter Diagnosis     ICD-10-CM    1. Tear of right supraspinatus tendon  M75.101       2. Status post rotator cuff repair  Z98.890           Start Time: 0845  Stop Time: 0950  Total time in clinic (min): 65 minutes    Assessment  Impairments: abnormal or restricted ROM, activity intolerance, impaired physical strength, lacks appropriate home exercise program and pain with function  Symptom irritability: low    Assessment details: Patient continues to show improvements with R shoulder mobility and strength per the assessment today. He has mild discomfort at end range of passive IR stretch which is more limited with scapular stabilization. There is mild restriction noted to the posterior aspect of the R shoulder during mobility assessment. Continued to progress strengthening interventions to the R shoulder with reports of fatigue but minimal reports of pain noted throughout the session.   Understanding of Dx/Px/POC: good     Prognosis: good    Goals  ST. Independent with HEP in 2 weeks - MET  2. Pt will have verbal report of improvement in symptoms by >/=25% in 2 weeks - MET    To be achieved by D/C   LT. Pt will improve FOTO score by >/= 5 points in 6 weeks - MET  2. Pt will improve FOTO score to >/= 61 by visit # 24 - Progressing  3. Pt will be able to return to work - Not MET  4. Pt will be able to perform ADLs independently - MET  5. Pt will be able to perform household chores independently - Progressing  6. Pt will be able to return to recreational activities without restriction - Progressing  7. Pt will be able to drive with no difficulty - MET      Plan  Patient would benefit from: skilled physical therapy    Planned therapy interventions: activity modification, manual therapy, motor coordination training, neuromuscular re-education,  patient education, self care, therapeutic activities, therapeutic exercise, graded activity, home exercise program, graded exercise, functional ROM exercises and strengthening    Frequency: 2x week  Duration in weeks: 6  Plan of Care beginning date: 2/20/2025  Plan of Care expiration date: 4/3/2025  Treatment plan discussed with: patient  Plan details: Patient will continue to benefit from skilled physical therapy to address the functional deficits that were identified during the evaluation today. We will continue to progress the therapy program to address these functional deficits and achieve the established goals.   Patient informed that from this point forward, to ensure adherence to the aforementioned plan of care, all or some of the treatment may be performed and carried out by a Physical Therapy Assistant (PTA) with supervision from a licensed Physical Therapist (PT) in accordance with [facility] Physical Therapy Practice Act.            Subjective Evaluation    History of Present Illness  Mechanism of injury: Pt under went RC repair on 9/11/24 for the second time. His original surgery was May 2023. He had no improvement from the surgery and when he was evaluated by a second opinion the tear was still present.     Since the surgery he is continuing to have pain. He notes compl    Pt is a : he would need to be able to lift 75lbs     Update 10/17/2024:  Patient reports pain levels are well controlled. He continues to report compliance to his sling and HEP. He feels that the shoulder is a little stiff but otherwise he is doing quite well. He denies any cardinal signs of infection.     Update 11/21/2024:  Patient reports that his arm is improving. He notes that he still has some soreness with activity but has been refraining from doing any quick movements and heavier lifting. He is still apprehensive to lay on the R shoulder as this will cause irritation to the shoulder.     Update  2024:  Patient reports that his shoulder is doing better and that he continues to see improvements with his daily activities. He notes that when he is laying on his side. His arm will get tired when he is using it for extended periods of time.     Update 2025:  Patient reports some increased soreness to the anterior and superior aspects of the R shoulder entering therapy today. He notes that he has more discomfort with active movement than at rest but also notes that he has some discomfort while sleeping. He feels that the increased resistance from his last therapy session made his shoulder fatigued and he is feeling delayed soreness from this but notes no significant changes.     Update 2025:  Patient reports slow improvements with his daily activities and finds that shaving is getting easier but his arm still fatigues after some time shaving. He feels that overhead reaching and reaching forward is also getting easier and he has less pain and more range during these activities.   Patient Goals  Patient goals for therapy: decreased pain and return to work  Patient goal: be able to play frisbee with his dogs, be able to ride his ATV,  be able to go camping,  Pain  Current pain ratin  At best pain ratin  At worst pain rating: 3  Location: R shoulder  Quality: discomfort and dull ache  Relieving factors: ice, medications, change in position, support and rest    Hand dominance: right          Objective     Active Range of Motion   Cervical/Thoracic Spine       Cervical    Flexion:  WFL  Extension:  Restriction level: moderate  Left lateral flexion:  Restriction level: minimal  Right lateral flexion:  Restriction level minimal  Left rotation:  Restriction level: minimal  Right rotation:  Restriction level: minimal  Left Shoulder   Normal active range of motion    Right Shoulder   Flexion: 157 degrees     Right Wrist   Normal active range of motion    Passive Range of Motion   Left Shoulder  "  Normal passive range of motion    Right Shoulder   Flexion: 165 degrees   External rotation 90°: 93 degrees   Internal rotation 90°: 65 degrees     Strength/Myotome Testing     Left Shoulder   Normal muscle strength    Right Shoulder     Planes of Motion   Flexion: 4   Extension: 4   Abduction: 4-   Adduction: 4+   External rotation at 0°: 4-   Internal rotation at 0°: 4-              Precautions: RC repair DOS 9/11/24  DATE 2/20 Reassess 2/7 2/11 2/13 2/18   FOTO 56 SC 53 JF      MANUALS        Shoulder PROM per protocol  10 min SC JF 10' JF 10 min [facility]           Neuro Re-Ed        Scap retraction         Standing scaption 4# 2x15 30 x 4#  flex  6#  row 30 x 20 x  4#   6# row  30 x  4# 2x15 5#  30 x  flex  6#  30 x   Side lying ER 2# 5\" 20x                       Ther Ex        UBE L3.2 3'/3' L 2.5   3/3 L 2  3/3 L3 3'/3' L 2  3/3   Gr band wall slide      20 x    OLGA, row, Triceps extension P5 2x15 row, OLGA  P4 2x15 triceps ext P 4 2 x 15 x  row / OLGA  Tri P4     30 x  P 4   20 x  OLGA  Tri  P 4  20 x P5 Row 2x15  P4 OLGA, Triceps 2x15 P5 30x ea row/ OLGA  Tri P 4  30 x   Standing abd   2#  30 x  2#  30 x 3# 2x15 3#  20 x   Supine abd/ circles  20 x 3#  20 x 3#     Bicep curls  6# 30 x 6#  30 x  6#  30 x    Chest press 20# 2x15 30 X 4 #  PRESS  Flex  4# 20 x 30 x  15# 2x15 15 #  2 x 15    Rear deltoid 35# 30x 25#  2 x 15 25#  30 x 30# 30x 30  #  30 x   Objective updates 10 min       ER/ IR   GTB   20 x 10x5\"  GTB  10x 5 \"    Ther Activity        Cones 3# 4x 5 cones 2 x   2# 2#  3x 3# 3x 5 cones 2#  4x   5 cones   Body Blade 20\" 3x ea.  15 \"  4 x 15\" 4 x 20\" 4x ea shoulder height 15 \"  4 x   Gait Training                Modalities        CP R Shld  10' 10' TS  defer                  Phase 1 (Weeks 0-6)              Immediate Postoperative Period              Goals:                          Diminish Pain and Inflammation  Maintain and Protect Integrity of the Repair                          Gradually Increase Passive " ROM (NO Active or Active Assist until Week 6)                          Become Independent with Modified ADLs              Precautions:  Maintain Arm in Abduction Sling, Remove Only for Directed Exercises (may remove Abduction Pillow after Day 21 for comfort)                          Keep Incisions Clean & Dry (okay to shower in 48 hours, band-aids over incisions)  No Immersion (pool) until Wounds Totally Sealed (usually not prior to day #10)  Passive Shoulder Motion ONLY, No Lifting/Holding Objects, Reaching Behind Back  Okay to Type/Write at Desktop with Arm in Sling              Day 0-6                          Elbow, Wrist, Hand AROM Exercises                           Start Cervical AROM and Scapula Isometrics                          Cryotherapy/Ice for Pain and Inflammation                          Instruct in Hygiene, Posture, and Positioning               Day 7-28                          Continue Above  May Start Pendulum Exercises                          May Start Supine, Pain Free, PT assisted PROM  Forward Flexion to 90°, External Rotation to 35° (Elbow at side), Internal Rotation to Body, Abduction to 60°                          Can Introduce light Cardio (Walking, Stationary Bike)                          Aqua Therapy may begin at week 3 (day 21) as long as no wound problems              Day 29-42                          Continue Above  Progress PROM to Goal of full PROM by Week 6.  May add Gentle Mobilizations (GH and Scapulothoracic) to Regain full PROM if Needed.  May add Heat prior to PROM Exercises, Ice after Exercises  May Begin AAROM at Day 29 if ROM is Appropriate in Anticipation of AROM Starting at Week 6              Criteria to Progress to Phase 2                          Reasonable Passive Forward Flexion, Abduction , IR/ER                          Time  Phase 2 (Weeks 6-12)              Protection and Active Motion              Goals of Phase:                          Allow Healing of  Soft Tissues                          Decrease Pain and Inflammation  Add ADLs and Regain AROM by End of Phase              Precautions:                          NO STRENGTHENING until Phase 3                          Repair is Most Prone to Failure during this Phase!                          No Lifting Objects > 2 lbs (Coffee Cup OK), no Sudden Motions                          Avoid Upper Extremity Bike and Ergometer              Day 43-56                          Discontinue Sling  Initiate AROM Exercises (forward flexion, ER, IR and abduction), Rotator Cuff Isometrics                          Continue Periscapular Exercises, add Stretching if PROM Lacking   No Strengthening until Week 12 (Minimum Time Needed for Cuff Healing Sufficient to withstand Strengthening)                Phase 3 (Weeks 12-16)              Early Strengthening              Goal of Phase:                          Gain full AROM, Maintain PROM                          Gradual return of Shoulder Strength, Power and Endurance                          Gradual return to Functional Activities              Precautions:                          No Lifting > 10lbs, Sudden Lifting or Pushing activities, Overhead Lifting                          No Upper Extremity Bike or Ergometer              Week 12                          Initiate Strengthening Program (10 lb Maximum until Phase 4)                            ER/IR with Bands (Standing)                            ER in Lateral Decubitius Position                            Lateral Raises                            Full Can in Scapular Plane                            Prone Rowing, Horizontal Abduction, Extension                            Elbow Flexion/Extension              Week 14-16                          Initiate light Functional Activities as Permitted  Progress to Fundamental Shoulder Exercises  Phase 4 (Variable but Weeks 16-24)              Aggressive Rehab  Sport Specific or Activity  Specific               Goals of Phase:                          Maintain Full Pain-free AROM                          Advanced Conditioning Exercises for enhanced Functional use                          Continue regaining Shoulder Strength, Power and Endurance                          Eventual return to full Functional Activities              Precautions:                          None              Week 16                          Continue ROM and stretching if appropriate                          Progress Strengthening, Proprioceptive and Neuromuscular Training                          Light Sports if Progressing Well (Chipping/Putting, easy ground strokes etc.)              Week 20                          Continue Strengthening and Stretching                          Initiate Interval Sports Program as Appropriate

## 2025-02-25 ENCOUNTER — OFFICE VISIT (OUTPATIENT)
Dept: PHYSICAL THERAPY | Facility: CLINIC | Age: 55
End: 2025-02-25
Payer: OTHER MISCELLANEOUS

## 2025-02-25 DIAGNOSIS — M75.101 TEAR OF RIGHT SUPRASPINATUS TENDON: ICD-10-CM

## 2025-02-25 DIAGNOSIS — Z98.890 STATUS POST ROTATOR CUFF REPAIR: Primary | ICD-10-CM

## 2025-02-25 PROCEDURE — 97112 NEUROMUSCULAR REEDUCATION: CPT

## 2025-02-25 PROCEDURE — 97110 THERAPEUTIC EXERCISES: CPT

## 2025-02-25 NOTE — PROGRESS NOTES
"Daily Note     Today's date: 2025  Patient name: Amari Jenkins  : 1970  MRN: [id number]  Referring provider: Belen Tatum P*  Dx:   Encounter Diagnosis     ICD-10-CM    1. Status post rotator cuff repair  Z98.890       2. Tear of right supraspinatus tendon  M75.101           Start Time: 0845  Stop Time: 930  Total time in clinic (min): 45 minutes    Subjective:  I am feeling pretty good .       Objective: See treatment diary below      Assessment: Tolerated treatment well. Patient would benefit from continued PT pt reports having some mild fatigue at times during his session today. He had most of his weakness noted with Er and standing body blade.  He had some tightness noted with passive IR and horizontal adduction. We will continue to work on his strength and motion.      Plan: Continue per plan of care.      Precautions: RC repair DOS 24  DATE  Reassess    FOTO 56 SC       MANUALS        Shoulder PROM per protocol  10 min SC JF 10' JF 10 min SC JF           Neuro Re-Ed        Scap retraction         Standing scaption 4# 2x15 30 x 4#  flex  6#  row 30 x 20 x  4#   6# row  30 x  4# 2x15 5#  30 x  flex  6#  30 x   Side lying ER 2# 5\" 20x 2#  5\" 20 x                      Ther Ex        UBE L3.2 3'/3' L 2.5   3/3 L 2  3/3 L3 3'/3' L 2  3/3   Gr band wall slide      20 x    OLGA, row, Triceps extension P5 2x15 row, OLGA  P4 2x15 triceps ext P 5 2 x 15 x  row / OLGA  Tri P4     30 x  P 4   20 x  OLGA  Tri  P 4  20 x P5 Row 2x15  P4 OLGA, Triceps 2x15 P5 30x ea row/ OLGA  Tri P 4  30 x   Standing abd    2#  30 x 3# 2x15 3#  20 x   Supine abd/ circles   20 x 3#     Bicep curls  6# 30 x 6#  30 x  6#  30 x    Chest press 20# 2x15 20 # 2 x 15 30 x  15# 2x15 15 #  2 x 15    Rear deltoid 35# 30x 35#  2 x 15 25#  30 x 30# 30x 30  #  30 x   Objective updates 10 min       ER/ IR   GTB   20 x 10x5\"  GTB  10x 5 \"    Ther Activity        Cones 3# 4x 5 cones 4 x   3 #  2#  3x 3# 3x 5 " "cones 2#  4x   5 cones   Body Blade 20\" 3x ea.  15 \"  4 x 15\" 4 x 20\" 4x ea shoulder height 15 \"  4 x   Gait Training                Modalities        CP R Shld  10' 10' TS  defer                    "

## 2025-02-27 ENCOUNTER — APPOINTMENT (OUTPATIENT)
Dept: PHYSICAL THERAPY | Facility: CLINIC | Age: 55
End: 2025-02-27
Payer: OTHER MISCELLANEOUS

## 2025-03-04 ENCOUNTER — OFFICE VISIT (OUTPATIENT)
Dept: PHYSICAL THERAPY | Facility: CLINIC | Age: 55
End: 2025-03-04
Payer: OTHER MISCELLANEOUS

## 2025-03-04 DIAGNOSIS — Z98.890 STATUS POST ROTATOR CUFF REPAIR: ICD-10-CM

## 2025-03-04 DIAGNOSIS — M75.101 TEAR OF RIGHT SUPRASPINATUS TENDON: Primary | ICD-10-CM

## 2025-03-04 PROCEDURE — 97112 NEUROMUSCULAR REEDUCATION: CPT

## 2025-03-04 PROCEDURE — 97110 THERAPEUTIC EXERCISES: CPT

## 2025-03-04 PROCEDURE — 97530 THERAPEUTIC ACTIVITIES: CPT

## 2025-03-04 NOTE — PROGRESS NOTES
"Daily Note     Today's date: 3/4/2025  Patient name: Amari Jenkins  : 1970  MRN: [id number]  Referring provider: Belen Tatum P*  Dx:   Encounter Diagnosis     ICD-10-CM    1. Tear of right supraspinatus tendon  M75.101       2. Status post rotator cuff repair  Z98.890           Start Time: 930  Stop Time: 1015  Total time in clinic (min): 45 minutes    Subjective:  My shoulder is doing ok.        Objective: See treatment diary below      Assessment: Tolerated treatment well. Patient would benefit from continued PT  pt completes his full program today with no pain noted in his shoulder. We will try in increase some wt for some of his exercises next visit. He still has some tightness noted with end ranges of all shoulder motions.  He had most tightness with horizontal adduction . He reports that he feels all motions are improving,  He states that he is still very careful what he does at home,       Plan: Continue per plan of care.      Precautions: RC repair DOS 24  DATE  Reassess 2/25 3/4 2/13 2/18   FOTO 56 SC       MANUALS        Shoulder PROM per protocol  10 min SC JF 10' JF 10 min SC JF           Neuro Re-Ed        Scap retraction         Standing scaption 4# 2x15 30 x 4#  flex  6#  row 30 x 20 x  4#   7# row  30 x  4# 2x15 5#  30 x  flex  6#  30 x   Side lying ER 2# 5\" 20x 2#  5\" 20 x 2#  5\"  20 x                     Ther Ex        UBE L3.2 3'/3' L 2.5   3/3 L 2.5   3/3 L3 3'/3' L 2  3/3   Gr band wall slide      20 x    OLGA, row, Triceps extension P5 2x15 row, OLGA  P4 2x15 triceps ext P 5 2 x 15 x  row / OLGA  Tri P4     30 x  P 5  30 x  OLGA  Tri  P 4  30 x P5 Row 2x15  P4 OLGA, Triceps 2x15 P5 30x ea row/ OLGA  Tri P 4  30 x   Standing abd     3# 2x15 3#  20 x   Supine abd/ circles        Bicep curls  6# 30 x 6#  30 x  6#  30 x    Chest press 20# 2x15 20 # 2 x 15 30 x   20 #  inc  15# 2x15 15 #  2 x 15    Rear deltoid 35# 30x 35#  2 x 15 35#  30 x 30# 30x 30  #  30 x   Objective " "updates 10 min       ER/ IR   GTB   20 x 10x5\"  GTB  10x 5 \"    Ther Activity        Cones 3# 4x 5 cones 4 x   3 #  3#  3x 3# 3x 5 cones 2#  4x   5 cones   Body Blade 20\" 3x ea.  15 \"  4 x 15\" 4 x 20\" 4x ea shoulder height 15 \"  4 x   Gait Training                Modalities        CP R Shld  10' 10' TS  defer                      "

## 2025-03-06 ENCOUNTER — OFFICE VISIT (OUTPATIENT)
Dept: PHYSICAL THERAPY | Facility: CLINIC | Age: 55
End: 2025-03-06
Payer: OTHER MISCELLANEOUS

## 2025-03-06 DIAGNOSIS — M75.101 TEAR OF RIGHT SUPRASPINATUS TENDON: Primary | ICD-10-CM

## 2025-03-06 DIAGNOSIS — Z98.890 STATUS POST ROTATOR CUFF REPAIR: ICD-10-CM

## 2025-03-06 PROCEDURE — 97112 NEUROMUSCULAR REEDUCATION: CPT | Performed by: PHYSICAL THERAPIST

## 2025-03-06 PROCEDURE — 97530 THERAPEUTIC ACTIVITIES: CPT | Performed by: PHYSICAL THERAPIST

## 2025-03-06 PROCEDURE — 97110 THERAPEUTIC EXERCISES: CPT | Performed by: PHYSICAL THERAPIST

## 2025-03-06 NOTE — PROGRESS NOTES
"Daily Note     Today's date: 3/6/2025  Patient name: Amari Jenkins  : 1970  MRN: [id number]  Referring provider: Belen Tatum P*  Dx:   Encounter Diagnosis     ICD-10-CM    1. Tear of right supraspinatus tendon  M75.101       2. Status post rotator cuff repair  Z98.890           Start Time: 930  Stop Time: 1015  Total time in clinic (min): 45 minutes    Subjective: Patient reports continues soreness/stiffness in the shoulder but offers no reports of increased pain during daily activities.       Objective: See treatment diary below      Assessment: Tolerated treatment well. Patient demonstrated fatigue post treatment, exhibited good technique with therapeutic exercises, and would benefit from continued PT Patient noted increased soreness with progression of strengthening interventions today. We focused on progression of functional strengthening with therapy interventions today.       Plan: Continue per plan of care.  Progress treament per protocol.      Precautions: RC repair DOS 24  DATE  Reassess 2/25 3/4 3/6 2/18   FOTO 56 SC       MANUALS        Shoulder PROM per protocol  10 min SC JF 10' JF 5 min SC JF           Neuro Re-Ed        Scap retraction         Standing scaption 4# 2x15 30 x 4#  flex  6#  row 30 x 20 x  4#   7# row  30 x  4# 2x15 5#  30 x  flex  6#  30 x   Side lying ER 2# 5\" 20x 2#  5\" 20 x 2#  5\"  20 x 3# 5\" 20x                    Ther Ex        UBE L3.2 3'/3' L 2.5   3/3 L 2.5   3/3 L4 3'/3' L 2  3/3   Gr band wall slide      20 x    OLGA, row, Triceps extension P5 2x15 row, OLGA  P4 2x15 triceps ext P 5 2 x 15 x  row / OLGA  Tri P4     30 x  P 5  30 x  OLGA  Tri  P 4  30 x P5 30x ea.  P5 30x ea row/ OLGA  Tri P 4  30 x   Standing abd      3#  20 x   Supine abd/ circles        Bicep curls  6# 30 x 6#  30 x 10# 20x 6#  30 x    Chest press 20# 2x15 20 # 2 x 15 30 x   20 #  inc  25# 30x 15 #  2 x 15    Rear deltoid 35# 30x 35#  2 x 15 35#  30 x 35# 2x15 30  #  30 x   Objective " "updates 10 min       ER/ IR   GTB   20 x 10x5\"  GTB  10x 5 \"    Ther Activity        Cones 3# 4x 5 cones 4 x   3 #  3#  3x 3# 4x 5 cones 2#  4x   5 cones   DB carry     Bottoms up 5# 2 laps    Body Blade 20\" 3x ea.  15 \"  4 x 15\" 4 x 30\" 4x ea.  15 \"  4 x   Gait Training                Modalities        CP R Shld  10' 10' TS  defer                        "

## 2025-03-11 ENCOUNTER — OFFICE VISIT (OUTPATIENT)
Dept: PHYSICAL THERAPY | Facility: CLINIC | Age: 55
End: 2025-03-11
Payer: OTHER MISCELLANEOUS

## 2025-03-11 DIAGNOSIS — M75.101 TEAR OF RIGHT SUPRASPINATUS TENDON: Primary | ICD-10-CM

## 2025-03-11 DIAGNOSIS — Z98.890 STATUS POST ROTATOR CUFF REPAIR: ICD-10-CM

## 2025-03-11 PROCEDURE — 97112 NEUROMUSCULAR REEDUCATION: CPT | Performed by: PHYSICAL THERAPIST

## 2025-03-11 PROCEDURE — 97110 THERAPEUTIC EXERCISES: CPT | Performed by: PHYSICAL THERAPIST

## 2025-03-11 PROCEDURE — 97530 THERAPEUTIC ACTIVITIES: CPT | Performed by: PHYSICAL THERAPIST

## 2025-03-11 NOTE — PROGRESS NOTES
"Daily Note     Today's date: 3/11/2025  Patient name: Amari Jenkins  : 1970  MRN: [id number]  Referring provider: Belen Tatum P*  Dx:   Encounter Diagnosis     ICD-10-CM    1. Tear of right supraspinatus tendon  M75.101       2. Status post rotator cuff repair  Z98.890           Start Time: 0800  Stop Time: 0848  Total time in clinic (min): 48 minutes    Subjective: Patient reports mild anterior shoulder discomfort entering therapy today. He notes that his arm will get tired with overhead activities.       Objective: See treatment diary below      Assessment: Tolerated treatment well. Patient demonstrated fatigue post treatment, exhibited good technique with therapeutic exercises, and would benefit from continued PT Patient continues to be challenged with progression of strengthening interventions. He felt most fatigue above shoulder level today.       Plan: Continue per plan of care.  Progress treatment as tolerated.       Precautions: RC repair DOS 24  DATE  Reassess 2/25 3/4 3/6 3/11   FOTO 56 SC       MANUALS        Shoulder PROM per protocol  10 min SC JF 10' JF 5 min SC            Neuro Re-Ed        Scap retraction         Standing scaption 4# 2x15 30 x 4#  flex  6#  row 30 x 20 x  4#   7# row  30 x  4# 2x15    Side lying ER 2# 5\" 20x 2#  5\" 20 x 2#  5\"  20 x 3# 5\" 20x    Prone Y, T     5\" 15x ea.            Ther Ex        UBE L3.2 3'/3' L 2.5   3/3 L 2.5   3/3 L4 3'/3' L4.0 3'/3'   Gr band wall slide          OLGA, row, Triceps extension P5 2x15 row, OLGA  P4 2x15 triceps ext P 5 2 x 15 x  row / OLGA  Tri P4     30 x  P 5  30 x  OLGA  Tri  P 4  30 x P5 30x ea.  P5 30x ea.    Standing abd         Supine abd/ circles        Bicep curls  6# 30 x 6#  30 x 10# 20x 10# 20x   Chest press 20# 2x15 20 # 2 x 15 30 x   20 #  inc  25# 30x 30# 30x   Rear deltoid 35# 30x 35#  2 x 15 35#  30 x 35# 2x15 35# 2x15   Objective updates 10 min       ER/ IR   GTB   20 x 10x5\"  GTB     Ther Activity      " "  Cones 3# 4x 5 cones 4 x   3 #  3#  3x 3# 4x 5 cones # 4x 5 cones   DB carry     Bottoms up 5# 2 laps Bottoms up 5# 3 laps   Body Blade 20\" 3x ea.  15 \"  4 x 15\" 4 x 30\" 4x ea.  30\" 4x ea.    Gait Training                Modalities        CP R Shld  10' 10' TS                            "

## 2025-03-13 ENCOUNTER — OFFICE VISIT (OUTPATIENT)
Dept: PHYSICAL THERAPY | Facility: CLINIC | Age: 55
End: 2025-03-13
Payer: OTHER MISCELLANEOUS

## 2025-03-13 DIAGNOSIS — Z98.890 STATUS POST ROTATOR CUFF REPAIR: Primary | ICD-10-CM

## 2025-03-13 DIAGNOSIS — M75.101 TEAR OF RIGHT SUPRASPINATUS TENDON: ICD-10-CM

## 2025-03-13 PROCEDURE — 97110 THERAPEUTIC EXERCISES: CPT

## 2025-03-13 PROCEDURE — 97112 NEUROMUSCULAR REEDUCATION: CPT

## 2025-03-13 PROCEDURE — 97530 THERAPEUTIC ACTIVITIES: CPT

## 2025-03-13 NOTE — PROGRESS NOTES
"Daily Note     Today's date: 3/13/2025  Patient name: Amari Jenkins  : 1970  MRN: [id number]  Referring provider: Belen Tatum P*  Dx:   Encounter Diagnosis     ICD-10-CM    1. Status post rotator cuff repair  Z98.890       2. Tear of right supraspinatus tendon  M75.101           Start Time: 0845  Stop Time: 930  Total time in clinic (min): 45 minutes    Subjective: I am stiff today I think because of the weather.       Objective: See treatment diary below      Assessment: Tolerated treatment well. Patient would benefit from continued PT   pt reports having some mild fatigue and some soreness in his shoulder today,.  He feels its from the weather,.  He did well with his exercises today and completed all with no increase in pain.,  he still reports having some mild soreness at end ranges of ER and iR.,       Plan: Continue per plan of care.      Precautions: RC repair DOS 24  DATE 3/13 2/25 3/4 3/6 3/11   FOTO 62  JF       MANUALS        Shoulder PROM per protocol  10 min JF JF 10' JF 5 min SC            Neuro Re-Ed        Scap retraction         Standing scaption 4# 2x15 30 x 4#  flex  6#  row 30 x 20 x  4#   7# row  30 x  4# 2x15    Side lying ER 2# 5\" 20x 2#  5\" 20 x 2#  5\"  20 x 3# 5\" 20x    Prone Y, T     5\" 15x ea.            Ther Ex        UBE L3.2 3'/3' L 2.5   3/3 L 2.5   3/3 L4 3'/3' L4.0 3'/3'   Gr band wall slide          OLGA, row, Triceps extension P5 2x15 row, OLGA  P5 2x15 triceps ext P 5 2 x 15 x  row / OLGA  Tri P4     30 x  P 5  30 x  OLGA  Tri  P 4  30 x P5 30x ea.  P5 30x ea.    Standing abd         Supine abd/ circles        Bicep curls 10 #  20 x 6# 30 x 6#  30 x 10# 20x 10# 20x   Chest press 30# 2x15 20 # 2 x 15 30 x   20 #  inc  25# 30x 30# 30x   Rear deltoid 35# 30x 35#  2 x 15 35#  30 x 35# 2x15 35# 2x15   Objective updates        ER/ IR   GTB   20 x 10x5\"  GTB     Ther Activity        Cones 3# 4x 5 cones 4 x   3 #  3#  3x 3# 4x 5 cones # 4x 5 cones   DB carry  Bottoms " "up 5# 3 laps   Bottoms up 5# 2 laps Bottoms up 5# 3 laps   Body Blade 30\" 4x ea.  15 \"  4 x 15\" 4 x 30\" 4x ea.  30\" 4x ea.    Gait Training                Modalities        CP JOHNNIE Goff  10' 10' TS                              "

## 2025-03-18 ENCOUNTER — OFFICE VISIT (OUTPATIENT)
Dept: PHYSICAL THERAPY | Facility: CLINIC | Age: 55
End: 2025-03-18
Payer: OTHER MISCELLANEOUS

## 2025-03-18 DIAGNOSIS — M75.101 TEAR OF RIGHT SUPRASPINATUS TENDON: ICD-10-CM

## 2025-03-18 DIAGNOSIS — Z98.890 STATUS POST ROTATOR CUFF REPAIR: Primary | ICD-10-CM

## 2025-03-18 PROCEDURE — 97530 THERAPEUTIC ACTIVITIES: CPT

## 2025-03-18 PROCEDURE — 97110 THERAPEUTIC EXERCISES: CPT

## 2025-03-18 PROCEDURE — 97112 NEUROMUSCULAR REEDUCATION: CPT

## 2025-03-18 NOTE — PROGRESS NOTES
"Daily Note     Today's date: 3/18/2025  Patient name: Amari Jenkins  : 1970  MRN: [id number]  Referring provider: Belen Tatum P*  Dx:   Encounter Diagnosis     ICD-10-CM    1. Status post rotator cuff repair  Z98.890       2. Tear of right supraspinatus tendon  M75.101                      Subjective: Pt notes some fatigue with overhead motions.      Objective: See treatment diary below      Assessment: Tolerated treatment well. Patient would benefit from continued PT.       Plan: Continue per plan of care.      Precautions: RC repair DOS 24  DATE 3/13 3/18 3/4 3/6 3/11   FOTO 62  JF       MANUALS        Shoulder PROM per protocol  10 min JF ds JF 5 min SC            Neuro Re-Ed        Standing scaption 4# 2x15 4# 2/15 20 x  4#   7# row  30 x  4# 2x15    Side lying ER 2# 5\" 20x 2#  5\" 20 x 2#  5\"  20 x 3# 5\" 20x    Prone Y, T  10x ea   5\" 15x ea.            Ther Ex        UBE L3.2 3'/3' 6m L4(old) L 2.5   3/3 L4 3'/3' L4.0 3'/3'   Gr band wall slide          OLGA, row, Triceps extension P5 2x15 row, OLGA  P5 2x15 triceps ext P5 2/15 ea P 5  30 x  OLGA  Tri  P 4  30 x P5 30x ea.  P5 30x ea.    Bicep curls 10 #  20 x 10# 20x  6#  30 x 10# 20x 10# 20x   Chest press 30# 2x15 30# 2/15 30 x   20 #  inc  25# 30x 30# 30x   Rear deltoid 35# 30x 35#  2/15 35#  30 x 35# 2x15 35# 2x15   Objective updates        Ther Activity        Cones 3# 4x 5 cones 3# 4x 5 cones OH 3#  3x 3# 4x 5 cones # 4x 5 cones   DB carry  Bottoms up 5# 3 laps 5# 3 laps  Bottoms up 5# 2 laps Bottoms up 5# 3 laps   Body Blade 30\" 4x ea.  4x 30\" 15\" 4 x 30\" 4x ea.  30\" 4x ea.    Gait Training                Modalities        CP R Shld   10' TS                                "

## 2025-03-20 ENCOUNTER — OFFICE VISIT (OUTPATIENT)
Dept: PHYSICAL THERAPY | Facility: CLINIC | Age: 55
End: 2025-03-20
Payer: OTHER MISCELLANEOUS

## 2025-03-20 DIAGNOSIS — Z98.890 STATUS POST ROTATOR CUFF REPAIR: ICD-10-CM

## 2025-03-20 DIAGNOSIS — M75.101 TEAR OF RIGHT SUPRASPINATUS TENDON: Primary | ICD-10-CM

## 2025-03-20 PROCEDURE — 97112 NEUROMUSCULAR REEDUCATION: CPT

## 2025-03-20 PROCEDURE — 97110 THERAPEUTIC EXERCISES: CPT

## 2025-03-20 PROCEDURE — 97530 THERAPEUTIC ACTIVITIES: CPT

## 2025-03-20 NOTE — PROGRESS NOTES
"Daily Note     Today's date: 3/20/2025  Patient name: Amari Jenkins  : 1970  MRN: [id number]  Referring provider: Belen Tatum P*  Dx:   Encounter Diagnosis     ICD-10-CM    1. Tear of right supraspinatus tendon  M75.101       2. Status post rotator cuff repair  Z98.890           Start Time: 930  Stop Time: 1015  Total time in clinic (min): 45 minutes    Subjective:  My shoulder is feeling pretty good.  I saw the Dr and he told me to stop therapy after my last two visits.   Dr Colin will contact DR Ledesma       Objective: See treatment diary below      Assessment: Tolerated treatment well. Patient would benefit from continued PT  pt completes his full program today with no pain noted. He is doing well with his exercises and has good passive and active motion in his right shoulder.  Pt was told by his Dr to stop therapy after his last scheduled visit.,      Plan: Continue per plan of care.      Precautions: RC repair DOS 24  DATE 3/13 3/18 3/20 3/6 3/11   FOTO 62  JF       MANUALS        Shoulder PROM per protocol  10 min JF ds JF 5 min SC            Neuro Re-Ed        Standing scaption 4# 2x15 4# 2/15 20 x  4#    4# 2x15    Side lying ER 2# 5\" 20x 2#  5\" 20 x 2#  5\"  20 x 3# 5\" 20x    Prone Y, T  10x ea 10 x   5\" 15x ea.            Ther Ex        UBE L3.2 3'/3' 6m L4(old) L 2.5   3/3 L4 3'/3' L4.0 3'/3'   Gr band wall slide          OLGA, row, Triceps extension P5 2x15 row, OLGA  P5 2x15 triceps ext P5 2/15 ea P 5  30 x  OLGA  Tri  P 5  30 x P5 30x ea.  P5 30x ea.    Bicep curls 10 #  20 x 10# 20x  10#  30 x 10# 20x 10# 20x   Chest press 30# 2x15 30# 2/15 30 #   2 x 15  25# 30x 30# 30x   Rear deltoid 35# 30x 35#  2/15 35#  30 x 35# 2x15 35# 2x15   Objective updates        Ther Activity        Cones 3# 4x 5 cones 3# 4x 5 cones OH 3#  3x 3# 4x 5 cones # 4x 5 cones   DB carry  Bottoms up 5# 3 laps 5# 3 laps 5#  3 laps Bottoms up 5# 2 laps Bottoms up 5# 3 laps   Body Blade 30\" 4x ea.  4x 30\" 15\" " "4 x 30\" 4x ea.  30\" 4x ea.    Gait Training                Modalities        CP R Shld   10' TS                                  "

## 2025-03-24 ENCOUNTER — OFFICE VISIT (OUTPATIENT)
Dept: OBGYN CLINIC | Facility: OTHER | Age: 55
End: 2025-03-24
Payer: OTHER MISCELLANEOUS

## 2025-03-24 VITALS — HEIGHT: 71 IN | BODY MASS INDEX: 29.12 KG/M2 | WEIGHT: 208 LBS

## 2025-03-24 DIAGNOSIS — Z98.890 S/P RIGHT ROTATOR CUFF REPAIR: Primary | ICD-10-CM

## 2025-03-24 PROCEDURE — 99213 OFFICE O/P EST LOW 20 MIN: CPT | Performed by: PHYSICIAN ASSISTANT

## 2025-03-24 NOTE — ASSESSMENT & PLAN NOTE
Continue with PT and transition to independent HEP.  Amari is making good progress.  He still has some overhead strength to regain and he will work on this at home.  He is aware endurance will come with time.  HEP - per therapist  Increase activities to tolerance.  Patient has Gym At home.  He will start working out at home and increasing weight to tolerance.  He will incorporate his HEP into these workouts.  Work restrictions - per Dr Colin.  Follow up: as needed

## 2025-03-24 NOTE — PROGRESS NOTES
"  Assessment & Plan  S/P right rotator cuff repair  Continue with PT and transition to independent HEP.  Amari is making good progress.  He still has some overhead strength to regain and he will work on this at home.  He is aware endurance will come with time.  HEP - per therapist  Increase activities to tolerance.  Patient has Gym At home.  He will start working out at home and increasing weight to tolerance.  He will incorporate his HEP into these workouts.  Work restrictions - per Dr Colin.  Follow up: as needed     Subjective:   Patient ID: Amari Jenkins is a 55 y.o. male    Amari presents in follow up of the right shoulder.  He is 6.5 months s/p arthroscopic rotator cuff repair (revision).  Amari is pleased with his progress.  He has been compliant with formal PT and his HEP.  He denies new injury or trauma.  He states his strength is progress, but he still feels fatigue away from his body and overhead.  He is up to 35# with PT.  He plans to transition out of PT after this week per guidance from Dr Colin.  He denies pain with ADLs or sleep.    The following portions of the patient's history were reviewed and updated as appropriate: allergies, current medications, past family history, past medical history, past social history, past surgical history and problem list.      Objective:  Ht 5' 11\" (1.803 m)   Wt 94.3 kg (208 lb)   BMI 29.01 kg/m²       Right Shoulder Exam     Tenderness   The patient is experiencing no tenderness.    Range of Motion   The patient has normal right shoulder ROM.    Muscle Strength   External rotation: 5/5   Supraspinatus: 4/5   Subscapularis: 5/5     Tests   Terry test: negative  Impingement: negative  Drop arm: negative    Other   Erythema: absent  Sensation: normal  Pulse: present            Physical Exam  Constitutional:       Appearance: He is well-developed.   HENT:      Head: Normocephalic.   Eyes:      Extraocular Movements: Extraocular movements intact.   Pulmonary: "      Effort: No respiratory distress.      Breath sounds: No wheezing.   Musculoskeletal:      Cervical back: Normal range of motion.   Skin:     General: Skin is warm and dry.   Neurological:      Mental Status: He is alert and oriented to person, place, and time.   Psychiatric:         Behavior: Behavior normal.         Thought Content: Thought content normal.         Judgment: Judgment normal.

## 2025-03-25 ENCOUNTER — EVALUATION (OUTPATIENT)
Dept: PHYSICAL THERAPY | Facility: CLINIC | Age: 55
End: 2025-03-25
Payer: OTHER MISCELLANEOUS

## 2025-03-25 DIAGNOSIS — M75.101 TEAR OF RIGHT SUPRASPINATUS TENDON: Primary | ICD-10-CM

## 2025-03-25 DIAGNOSIS — Z98.890 STATUS POST ROTATOR CUFF REPAIR: ICD-10-CM

## 2025-03-25 PROCEDURE — 97110 THERAPEUTIC EXERCISES: CPT | Performed by: PHYSICAL THERAPIST

## 2025-03-25 PROCEDURE — 97530 THERAPEUTIC ACTIVITIES: CPT | Performed by: PHYSICAL THERAPIST

## 2025-03-25 PROCEDURE — 97112 NEUROMUSCULAR REEDUCATION: CPT | Performed by: PHYSICAL THERAPIST

## 2025-03-25 NOTE — PROGRESS NOTES
PT Re-Evaluation     Today's date: 3/25/2025  Patient name: Amari Jenkins  : 1970  MRN: [id number]  Referring provider: Belen Tatum P*  Dx:   Encounter Diagnosis     ICD-10-CM    1. Tear of right supraspinatus tendon  M75.101       2. Status post rotator cuff repair  Z98.890           Start Time: 845  Stop Time: 930  Total time in clinic (min): 45 minutes    Assessment  Impairments: abnormal or restricted ROM, activity intolerance, impaired physical strength, lacks appropriate home exercise program and pain with function  Symptom irritability: low    Assessment details: Patient has continued to show improvements with both mobility and strength per his evaluation today. Overall his strength is improving nicely with greatest deficit being during resisted ER and abduction. He has regained functional mobility of the R shoulder both passively and actively. We will review all exercises which he will continue with a self guided HEP at his next appointment.   Understanding of Dx/Px/POC: good     Prognosis: good    Goals  ST. Independent with HEP in 2 weeks - MET  2. Pt will have verbal report of improvement in symptoms by >/=25% in 2 weeks - MET    To be achieved by D/C   LT. Pt will improve FOTO score by >/= 5 points in 6 weeks - MET  2. Pt will improve FOTO score to >/= 61 by visit # 24 - Progressing  3. Pt will be able to return to work - Not MET  4. Pt will be able to perform ADLs independently - MET  5. Pt will be able to perform household chores independently - Progressing  6. Pt will be able to return to recreational activities without restriction - Progressing  7. Pt will be able to drive with no difficulty - MET      Plan  Patient would benefit from: skilled physical therapy    Planned therapy interventions: activity modification, manual therapy, motor coordination training, neuromuscular re-education, patient education, self care, therapeutic activities, therapeutic exercise, graded  activity, home exercise program, graded exercise, functional ROM exercises and strengthening    Frequency: 1x week  Duration in weeks: 1  Plan of Care beginning date: 3/25/2025  Plan of Care expiration date: 3/30/2025  Treatment plan discussed with: patient  Plan details: Patient will continue to benefit from skilled physical therapy to address the functional deficits that were identified during the evaluation today. We will continue to progress the therapy program to address these functional deficits and achieve the established goals.   Patient informed that from this point forward, to ensure adherence to the aforementioned plan of care, all or some of the treatment may be performed and carried out by a Physical Therapy Assistant (PTA) with supervision from a licensed Physical Therapist (PT) in accordance with [facility] Physical Therapy Practice Act.            Subjective Evaluation    History of Present Illness  Mechanism of injury: Pt under went RC repair on 9/11/24 for the second time. His original surgery was May 2023. He had no improvement from the surgery and when he was evaluated by a second opinion the tear was still present.     Since the surgery he is continuing to have pain. He notes compl    Pt is a : he would need to be able to lift 75lbs     Update 10/17/2024:  Patient reports pain levels are well controlled. He continues to report compliance to his sling and HEP. He feels that the shoulder is a little stiff but otherwise he is doing quite well. He denies any cardinal signs of infection.     Update 11/21/2024:  Patient reports that his arm is improving. He notes that he still has some soreness with activity but has been refraining from doing any quick movements and heavier lifting. He is still apprehensive to lay on the R shoulder as this will cause irritation to the shoulder.     Update 12/17/2024:  Patient reports that his shoulder is doing better and that he continues to see  improvements with his daily activities. He notes that when he is laying on his side. His arm will get tired when he is using it for extended periods of time.     Update 2025:  Patient reports some increased soreness to the anterior and superior aspects of the R shoulder entering therapy today. He notes that he has more discomfort with active movement than at rest but also notes that he has some discomfort while sleeping. He feels that the increased resistance from his last therapy session made his shoulder fatigued and he is feeling delayed soreness from this but notes no significant changes.     Update 2025:  Patient reports slow improvements with his daily activities and finds that shaving is getting easier but his arm still fatigues after some time shaving. He feels that overhead reaching and reaching forward is also getting easier and he has less pain and more range during these activities.     Update 3/25/2025:  Patient reports that his shoulder continues to progress nicely. He feels that his arm is still getting tired when shaving but otherwise his shoulder is able to tolerate his daily activities without much limitation. He saw the doctor's team yesterday who is pleased with his current progress and he will be discharging therapy on Thursday.   Patient Goals  Patient goals for therapy: decreased pain and return to work  Patient goal: be able to play frisbee with his dogs, be able to ride his ATV,  be able to go camping,  Pain  Current pain ratin  At best pain ratin  At worst pain rating: 3  Location: R shoulder  Quality: discomfort and dull ache  Relieving factors: ice, medications, change in position, support and rest    Hand dominance: right          Objective     Active Range of Motion   Cervical/Thoracic Spine       Cervical    Flexion:  WFL  Extension:  Restriction level: moderate  Left lateral flexion:  Restriction level: minimal  Right lateral flexion:  Restriction level minimal  Left  "rotation:  Restriction level: minimal  Right rotation:  Restriction level: minimal  Left Shoulder   Normal active range of motion    Right Shoulder   Flexion: 162 degrees     Right Wrist   Normal active range of motion    Passive Range of Motion   Left Shoulder   Normal passive range of motion    Right Shoulder   Flexion: 165 degrees   External rotation 90°: 93 degrees   Internal rotation 90°: 65 degrees     Strength/Myotome Testing     Left Shoulder   Normal muscle strength    Right Shoulder     Planes of Motion   Flexion: 4+   Extension: 4+   Abduction: 4   Adduction: 4+   External rotation at 0°: 4   Internal rotation at 0°: 4+              Precautions: RC repair DOS 9/11/24  DATE 3/13 3/18 3/20 3/25 Reassess 3/11   FOTO 62  JF       MANUALS        Shoulder PROM per protocol  10 min JF ds JF NT            Neuro Re-Ed        Standing scaption 4# 2x15 4# 2/15 20 x  4#    4# 2x15    Side lying ER 2# 5\" 20x 2#  5\" 20 x 2#  5\"  20 x     Prone Y, T  10x ea 10 x  10x ea 5\" 15x ea.            Ther Ex        UBE L3.2 3'/3' 6m L4(old) L 2.5   3/3 L3.0 3'/3' L4.0 3'/3'   Gr band wall slide          OLGA, row, Triceps extension P5 2x15 row, OLGA  P5 2x15 triceps ext P5 2/15 ea P 5  30 x  OLGA  Tri  P 5  30 x P5 30x ea.  P5 30x ea.    Bicep curls 10 #  20 x 10# 20x  10#  30 x 10# 30x 10# 20x   Chest press 30# 2x15 30# 2/15 30 #   2 x 15  30# 2x15 30# 30x   Rear deltoid 35# 30x 35#  2/15 35#  30 x 35# 30x 35# 2x15   Objective updates    5 min    Ther Activity        Cones 3# 4x 5 cones 3# 4x 5 cones OH 3#  3x 3# 5x # 4x 5 cones   DB carry  Bottoms up 5# 3 laps 5# 3 laps 5#  3 laps 5# 3 laps Bottoms up 5# 3 laps   Body Blade 30\" 4x ea.  4x 30\" 15\" 4 x 15\" 4x ea.  30\" 4x ea.    Gait Training                Modalities        CP R Shld   10' TS                    Phase 1 (Weeks 0-6)              Immediate Postoperative Period              Goals:                          Diminish Pain and Inflammation  Maintain and Protect Integrity " of the Repair                          Gradually Increase Passive ROM (NO Active or Active Assist until Week 6)                          Become Independent with Modified ADLs              Precautions:  Maintain Arm in Abduction Sling, Remove Only for Directed Exercises (may remove Abduction Pillow after Day 21 for comfort)                          Keep Incisions Clean & Dry (okay to shower in 48 hours, band-aids over incisions)  No Immersion (pool) until Wounds Totally Sealed (usually not prior to day #10)  Passive Shoulder Motion ONLY, No Lifting/Holding Objects, Reaching Behind Back  Okay to Type/Write at Desktop with Arm in Sling              Day 0-6                          Elbow, Wrist, Hand AROM Exercises                           Start Cervical AROM and Scapula Isometrics                          Cryotherapy/Ice for Pain and Inflammation                          Instruct in Hygiene, Posture, and Positioning               Day 7-28                          Continue Above  May Start Pendulum Exercises                          May Start Supine, Pain Free, PT assisted PROM  Forward Flexion to 90°, External Rotation to 35° (Elbow at side), Internal Rotation to Body, Abduction to 60°                          Can Introduce light Cardio (Walking, Stationary Bike)                          Aqua Therapy may begin at week 3 (day 21) as long as no wound problems              Day 29-42                          Continue Above  Progress PROM to Goal of full PROM by Week 6.  May add Gentle Mobilizations (GH and Scapulothoracic) to Regain full PROM if Needed.  May add Heat prior to PROM Exercises, Ice after Exercises  May Begin AAROM at Day 29 if ROM is Appropriate in Anticipation of AROM Starting at Week 6              Criteria to Progress to Phase 2                          Reasonable Passive Forward Flexion, Abduction , IR/ER                          Time  Phase 2 (Weeks 6-12)              Protection and Active Motion               Goals of Phase:                          Allow Healing of Soft Tissues                          Decrease Pain and Inflammation  Add ADLs and Regain AROM by End of Phase              Precautions:                          NO STRENGTHENING until Phase 3                          Repair is Most Prone to Failure during this Phase!                          No Lifting Objects > 2 lbs (Coffee Cup OK), no Sudden Motions                          Avoid Upper Extremity Bike and Ergometer              Day 43-56                          Discontinue Sling  Initiate AROM Exercises (forward flexion, ER, IR and abduction), Rotator Cuff Isometrics                          Continue Periscapular Exercises, add Stretching if PROM Lacking   No Strengthening until Week 12 (Minimum Time Needed for Cuff Healing Sufficient to withstand Strengthening)                Phase 3 (Weeks 12-16)              Early Strengthening              Goal of Phase:                          Gain full AROM, Maintain PROM                          Gradual return of Shoulder Strength, Power and Endurance                          Gradual return to Functional Activities              Precautions:                          No Lifting > 10lbs, Sudden Lifting or Pushing activities, Overhead Lifting                          No Upper Extremity Bike or Ergometer              Week 12                          Initiate Strengthening Program (10 lb Maximum until Phase 4)                            ER/IR with Bands (Standing)                            ER in Lateral Decubitius Position                            Lateral Raises                            Full Can in Scapular Plane                            Prone Rowing, Horizontal Abduction, Extension                            Elbow Flexion/Extension              Week 14-16                          Initiate light Functional Activities as Permitted  Progress to Fundamental Shoulder Exercises  Phase 4 (Variable but Weeks  16-24)              Aggressive Rehab  Sport Specific or Activity Specific               Goals of Phase:                          Maintain Full Pain-free AROM                          Advanced Conditioning Exercises for enhanced Functional use                          Continue regaining Shoulder Strength, Power and Endurance                          Eventual return to full Functional Activities              Precautions:                          None              Week 16                          Continue ROM and stretching if appropriate                          Progress Strengthening, Proprioceptive and Neuromuscular Training                          Light Sports if Progressing Well (Chipping/Putting, easy ground strokes etc.)              Week 20                          Continue Strengthening and Stretching                          Initiate Interval Sports Program as Appropriate

## 2025-03-27 ENCOUNTER — OFFICE VISIT (OUTPATIENT)
Dept: PHYSICAL THERAPY | Facility: CLINIC | Age: 55
End: 2025-03-27
Payer: OTHER MISCELLANEOUS

## 2025-03-27 DIAGNOSIS — M75.101 TEAR OF RIGHT SUPRASPINATUS TENDON: ICD-10-CM

## 2025-03-27 DIAGNOSIS — Z98.890 STATUS POST ROTATOR CUFF REPAIR: Primary | ICD-10-CM

## 2025-03-27 PROCEDURE — 97110 THERAPEUTIC EXERCISES: CPT

## 2025-03-27 PROCEDURE — 97112 NEUROMUSCULAR REEDUCATION: CPT

## 2025-03-27 PROCEDURE — 97530 THERAPEUTIC ACTIVITIES: CPT

## 2025-03-27 NOTE — PROGRESS NOTES
"Daily Note     Today's date: 3/27/2025  Patient name: Amari Jenkins  : 1970  MRN: [id number]  Referring provider: Belen Tatum P*  Dx:   Encounter Diagnosis     ICD-10-CM    1. Status post rotator cuff repair  Z98.890       2. Tear of right supraspinatus tendon  M75.101                      Subjective:  My shoulder is doing ok.  I will be doing my exercises at home,.       Objective: See treatment diary below      Assessment: Tolerated treatment well. Patient would benefit from continued PT   pt will finish with his formal therapy today and will continue with his exercises at home., pt was given bands to use at home . He feels comfortable with his exercises for home . He went through most of his exercises today with some fatigue noted. Pt still has some mild weakness with ER and flexion.  Pt reports having no pain in his shoulder post treatment today.      Plan:  D/C to home program as per Dr bernstein     Precautions: RC repair DOS 24  DATE 3/13 3/18 3/20 3/25 Reassess 3/27   FOTO 62  JF       MANUALS        Shoulder PROM per protocol  10 min JF ds JF NT            Neuro Re-Ed        Standing scaption 4# 2x15 4# 2/15 20 x  4#    4# 2x15 4#  2 x 15    Side lying ER 2# 5\" 20x 2#  5\" 20 x 2#  5\"  20 x  2# 20 x   Prone Y, T  10x ea 10 x  10x ea 10 x           Ther Ex        UBE L3.2 3'/3' 6m L4(old) L 2.5   3/3 L3.0 3'/3' L4.0 3'/3'   Gr band wall slide          OLGA, row, Triceps extension P5 2x15 row, OLGA  P5 2x15 triceps ext P5 2/15 ea P 5  30 x  OLGA  Tri  P 5  30 x P5 30x ea.  P5 30x ea.    Bicep curls 10 #  20 x 10# 20x  10#  30 x 10# 30x 10# 20x   Chest press 30# 2x15 30# 2/15 30 #   2 x 15  30# 2x15 30# 30x   Rear deltoid 35# 30x 35#  2/15 35#  30 x 35# 30x 35# 2x15   Objective updates    5 min    Ther Activity        Cones 3# 4x 5 cones 3# 4x 5 cones OH 3#  3x 3# 5x    DB carry  Bottoms up 5# 3 laps 5# 3 laps 5#  3 laps 5# 3 laps Bottoms up 5# 3 laps   Body Blade 30\" 4x ea.  4x 30\" 15\" 4 x " "15\" 4x ea.  30\" 4x ea.    Gait Training                Modalities        CP R Shld   10' TS                      "

## (undated) DEVICE — PROBE ABLATION  APOLLO RF ASPIRING 90 DEG

## (undated) DEVICE — GLOVE SRG BIOGEL ECLIPSE 7

## (undated) DEVICE — 3M™ STERI-STRIP™ REINFORCED ADHESIVE SKIN CLOSURES, R1547, 1/2 IN X 4 IN (12 MM X 100 MM), 6 STRIPS/ENVELOPE: Brand: 3M™ STERI-STRIP™

## (undated) DEVICE — SUT MONOCRYL 4-0 PS-2 27 IN Y426H

## (undated) DEVICE — SKN PRP WNG SPNGE PVP SCRB STR: Brand: MEDLINE INDUSTRIES, INC.

## (undated) DEVICE — INTENDED FOR TISSUE SEPARATION, AND OTHER PROCEDURES THAT REQUIRE A SHARP SURGICAL BLADE TO PUNCTURE OR CUT.: Brand: BARD-PARKER ® CARBON RIB-BACK BLADES

## (undated) DEVICE — DRESSING MEPILEX AG BORDER 4 X 4 IN

## (undated) DEVICE — EXPRESSEW III SUTURE NEEDLE FOR USE WITH EXPRESSEW II OR III SUTURE PASSER: Brand: EXPRESSEW

## (undated) DEVICE — BLADE SHAVER EXCALIBUR 4MM 13CM COOLCUT

## (undated) DEVICE — BLADE SHAVER DISSECTOR 4MM 13CM COOLCUT

## (undated) DEVICE — GLOVE SRG BIOGEL 7.5

## (undated) DEVICE — SHOULDER SUSPENSION KIT 6 PER BOX

## (undated) DEVICE — GLOVE INDICATOR PI UNDERGLOVE SZ 7.5 BLUE

## (undated) DEVICE — TUBING SUCTION 5MM X 12 FT

## (undated) DEVICE — PACK PBDS SHOULDER ARTHROSCOPY RF

## (undated) DEVICE — BLADE SHAVER DISSECTOR 3.5MM 13CM COOLCUT

## (undated) DEVICE — THREADED CLEAR CANNULA WITH OBTURATOR 7MM X 75MM

## (undated) DEVICE — THREADED CLEAR CANNULA WITH OBTURATOR 8.5MM X 75MM